# Patient Record
Sex: FEMALE | Race: WHITE | NOT HISPANIC OR LATINO | Employment: UNEMPLOYED | URBAN - METROPOLITAN AREA
[De-identification: names, ages, dates, MRNs, and addresses within clinical notes are randomized per-mention and may not be internally consistent; named-entity substitution may affect disease eponyms.]

---

## 2020-09-17 ENCOUNTER — OFFICE VISIT (OUTPATIENT)
Dept: OPHTHALMOLOGY | Facility: CLINIC | Age: 55
End: 2020-09-17
Attending: OPHTHALMOLOGY
Payer: COMMERCIAL

## 2020-09-17 DIAGNOSIS — H35.9 RETINAL LESION: ICD-10-CM

## 2020-09-17 DIAGNOSIS — L72.0: ICD-10-CM

## 2020-09-17 DIAGNOSIS — H52.203 MYOPIA OF BOTH EYES WITH ASTIGMATISM: ICD-10-CM

## 2020-09-17 DIAGNOSIS — H40.003 GLAUCOMA SUSPECT OF BOTH EYES: Primary | ICD-10-CM

## 2020-09-17 DIAGNOSIS — H52.13 MYOPIA OF BOTH EYES WITH ASTIGMATISM: ICD-10-CM

## 2020-09-17 PROCEDURE — 92015 DETERMINE REFRACTIVE STATE: CPT | Mod: ZF

## 2020-09-17 PROCEDURE — 40001009 ZZH VIDEO/TELEPHONE VISIT; NO CHARGE

## 2020-09-17 ASSESSMENT — VISUAL ACUITY
OD_SC: 20/20
OD_SC+: -2
METHOD: SNELLEN - LINEAR
OS_SC: 20/20-1

## 2020-09-17 ASSESSMENT — SLIT LAMP EXAM - LIDS
COMMENTS: NORMAL
COMMENTS: NORMAL

## 2020-09-17 ASSESSMENT — REFRACTION_MANIFEST
OD_SPHERE: -0.25
OD_CYLINDER: SPHERE
OS_AXIS: 065
OD_ADD: +1.75
OS_ADD: +1.75
OS_SPHERE: -0.25
OS_CYLINDER: +0.75

## 2020-09-17 ASSESSMENT — TONOMETRY
OS_IOP_MMHG: 13
IOP_METHOD: TONOPEN
OD_IOP_MMHG: 13

## 2020-09-17 ASSESSMENT — CUP TO DISC RATIO
OS_RATIO: 0.65
OD_RATIO: 0.4

## 2020-09-17 ASSESSMENT — EXTERNAL EXAM - LEFT EYE: OS_EXAM: NORMAL

## 2020-09-17 ASSESSMENT — CONF VISUAL FIELD
OS_NORMAL: 1
OD_NORMAL: 1

## 2020-09-17 ASSESSMENT — EXTERNAL EXAM - RIGHT EYE: OD_EXAM: NORMAL

## 2020-09-17 NOTE — PROGRESS NOTES
"  OPHTHALMOLOGY CLINIC NOTE  09/17/20    Patient: Sharri Watson    HISTORY OF PRESENTING ILLNESS:     Sharri Watson is a 55 year old female with no PMHx presenting for a CEE.     Patient states that she saw some \"bubbles \"underneath her eye, about a year ago.  She was evaluated by an optometrist, who referred her here for an eye examination.  She states that these lesions have remained about the same size -they were initially large, although now they have decreased and are now the same size.  These lesions don't cause any pain, they don't get red.     She reports that she is interested in progressive lenses.  Presently, she reports difficulty seeing up close. She uses reading glasses which help; no glasses for distance.      Furthermore patient denies any other change in her vision, no floaters, no diplopia, no loss of vision or shadow of her vision.  She denies flashes of lights, although states that she has headaches infrequently -- associated with scintillating scotomas, with flashing alternating triangular prisms in the peripheral aspect of her eyes. She has these headaches every few months or so.     10+ review of systems were otherwise negative except for that which has been stated above.    OCULAR/MEDICAL/SURGICAL HISTORIES:     Past Medical History:  None - No HTN, No DM. No other significant medical history.    Past Ocular History:  No eye hx.   Stopped wearing glasses - at age 31.  Started wearing readers for  Up close.   No hx of eye surgeries.  No ocular trauma.     Family History:  Grandmother with Glaucoma, became legally blind from this.  Both parents with cataract; sister getting cataract surgery.  Dad with macular degeneration     Social History:  No smoke.     History reviewed. No pertinent past medical history.    History reviewed. No pertinent surgical history.    EXAMINATION:     Base Eye Exam     Visual Acuity (Snellen - Linear)       Right Left    Dist sc 20/20 -2 20/20-1          " Tonometry (Tonopen, 1:24 PM)       Right Left    Pressure 13 13          Pupils       React APD    Right Reactive None    Left Reactive None          Visual Fields       Left Right     Full Full          Extraocular Movement       Right Left     Full Full          Neuro/Psych     Oriented x3:  Yes            Slit Lamp and Fundus Exam     External Exam       Right Left    External Normal Normal          Slit Lamp Exam       Right Left    Lids/Lashes Normal Normal    Conjunctiva/Sclera White and quiet; cystic clear lesion in the inferonasal and inferotemporal aspect of bulbar conjunctiva White and quiet    Cornea Clear Clear    Anterior Chamber Deep and quiet Deep and quiet    Iris Round and reactive -> Dilating Round and reactive -> Dilating    Lens Trace NS Trace NS    Vitreous Normal Normal          Fundus Exam       Right Left    Disc Normal Normal    C/D Ratio 0.4 0.65    Macula Normal Normal; temporal macula with small hyperpigmented lesion    Vessels Normal Normal    Periphery Several small hypopigmented yellow lesions in superotemporal aspect.  Several small hypopigmented yellow lesions in superotemporal aspect.             Refraction     Manifest Refraction       Sphere Cylinder [place] Dist VA Add Near VA    Right -0.25 Sphere  20/20 +1.75 J1+    Left -0.25 +0.75 065 20/20 +1.75 J1+          Final Rx       Sphere Cylinder [place] Dist VA Add Near VA    Right -0.25 Sphere  20/20 +1.75 J1+    Left -0.25 +0.75 065 20/20 +1.75 J1+              Labs/Studies/Imaging Performed  N/A     ASSESSMENT/PLAN:     Sharri Watson is a 55 year old female with no PMHx presenting for a CEE.     (H40.003) Glaucoma suspect of both eyes  (primary encounter diagnosis)  Comment:  Based on C/D Asymmetry (0.4 right eye, 0.65 left eye).  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 They have not s/f changed in size. Cystic fluid filled conjunctival lesion. Explained r/b/a of removal of lesion, with risks of recurrence. Patient wants to undergo removal of these lesions.    Plan: Plan for removal with cyst drainage next visit.     (H35.9) Retinal lesion  Comment: Yellow circular hypopigmented lesions present in the superotemporal aspect of both eyes. No signs of active inflammation with this, with no retinitis, or vasculitis, or vitreous debris.   Plan: Obtain Optos photograph next visit.     (H52.13,  H52.203) Myopia of both eyes with astigmatism  Comment: New MRx with BCVA of 20/20 each eye, and J1+  Plan: MRx provided to patient.     Return in about 3 weeks (around 10/8/2020) for HVF, OCT RNFL, pachy, Optos photos, next visit.    Patient seen with Dr. Joseph.     Ketan Henry MD PGY2  Department of Ophthalmology  Pager: [phone]    Attending Physician Attestation:  Complete documentation of historical and exam elements from today's encounter can be found in the full encounter summary report (not reduplicated in this progress note).  I personally obtained the chief complaint(s) and history of present illness.  I confirmed and edited as necessary the review of systems, past medical/surgical history, family history, social history, and examination findings as documented by others; and I examined the patient myself.  I personally reviewed the relevant tests, images, and reports as documented above.  I formulated and edited as necessary the assessment and plan and discussed the findings and management plan with the patient and family. . - Jan Joseph MD

## 2020-11-05 ENCOUNTER — OFFICE VISIT (OUTPATIENT)
Dept: OPHTHALMOLOGY | Facility: CLINIC | Age: 55
End: 2020-11-05
Attending: OPHTHALMOLOGY
Payer: COMMERCIAL

## 2020-11-05 DIAGNOSIS — H35.9 RETINAL LESION: ICD-10-CM

## 2020-11-05 DIAGNOSIS — H40.003 GLAUCOMA SUSPECT OF BOTH EYES: Primary | ICD-10-CM

## 2020-11-05 DIAGNOSIS — L72.0: ICD-10-CM

## 2020-11-05 PROCEDURE — 92133 CPTRZD OPH DX IMG PST SGM ON: CPT | Performed by: OPHTHALMOLOGY

## 2020-11-05 PROCEDURE — 99207 OCT OPTIC NERVE RNFL SPECTRALIS OU (BOTH EYES): CPT | Performed by: OPHTHALMOLOGY

## 2020-11-05 PROCEDURE — 92250 FUNDUS PHOTOGRAPHY W/I&R: CPT | Performed by: OPHTHALMOLOGY

## 2020-11-05 PROCEDURE — G0463 HOSPITAL OUTPT CLINIC VISIT: HCPCS

## 2020-11-05 PROCEDURE — 99213 OFFICE O/P EST LOW 20 MIN: CPT | Performed by: OPHTHALMOLOGY

## 2020-11-05 PROCEDURE — 92083 EXTENDED VISUAL FIELD XM: CPT | Performed by: OPHTHALMOLOGY

## 2020-11-05 ASSESSMENT — VISUAL ACUITY
OD_SC: 20/20
METHOD: SNELLEN - LINEAR
OS_SC: 20/20 SLOW

## 2020-11-05 ASSESSMENT — PACHYMETRY
OD_CT(UM): 546
OS_CT(UM): 546

## 2020-11-05 ASSESSMENT — TONOMETRY
IOP_METHOD: APPLANATION
OS_IOP_MMHG: 19
OD_IOP_MMHG: 17

## 2020-11-05 ASSESSMENT — EXTERNAL EXAM - LEFT EYE: OS_EXAM: NORMAL

## 2020-11-05 ASSESSMENT — CUP TO DISC RATIO
OS_RATIO: 0.65
OD_RATIO: 0.4

## 2020-11-05 ASSESSMENT — CONF VISUAL FIELD
OD_NORMAL: 1
OS_NORMAL: 1
METHOD: COUNTING FINGERS

## 2020-11-05 ASSESSMENT — EXTERNAL EXAM - RIGHT EYE: OD_EXAM: NORMAL

## 2020-11-05 ASSESSMENT — SLIT LAMP EXAM - LIDS
COMMENTS: NORMAL
COMMENTS: NORMAL

## 2020-11-05 NOTE — NURSING NOTE
Chief Complaint(s) and History of Present Illness(es)     Glaucoma Follow-Up     In both eyes.  Associated symptoms include Negative for dryness, eye pain, redness and tearing.  Pain was noted as 0/10.              Comments     7 week f/u for Glaucoma suspect and also plan for removal with cyst drainage today. Vision is the same since the last visit. Pt denies any other changes.     Ocular meds: Visine JIMN IRMA [place] 10:14 AM November 5, 2020

## 2020-11-05 NOTE — PROGRESS NOTES
Chief Complaint(s) and History of Present Illness(es)     Glaucoma Follow-Up     Laterality: both eyes    Associated symptoms: Negative for dryness, eye pain, redness and tearing    Pain scale: 0/10              Comments     7 week f/u for Glaucoma suspect and also plan for removal with cyst   drainage today. Vision is the same since the last visit. Pt denies any   other changes.     Ocular meds: Visine PRN BE    Judie Woods, COMT 10:14 AM November 5, 2020                 Review of systems for the eyes was negative other than the pertinent positives/negatives listed in the HPI.      Assessment & Plan      Sharri Watson is a 55 year old female with the following diagnoses:   1. Glaucoma suspect of both eyes    2. Retinal lesion    3. 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 Henry last month and is returning for glaucoma work-up   Intraocular pressure excellent again today   Physiologic cupping left eye   Baseline OCT and visual field within normal limits   Fundus photos obtained    Interested in having the conjunctival cysts excised in the right eye   Causing some foreign body sensation and intermittent blur  Would have best result doing this in the OR with better equipment  RBA reviewed, consent obtained, will schedule               Attending Physician Attestation:  Complete documentation of historical and exam elements from today's encounter can be found in the full encounter summary report (not reduplicated in this progress note).  I personally obtained the chief complaint(s) and history of present illness.  I confirmed and edited as necessary the review of systems, past medical/surgical history, family history, social history, and examination findings as documented by others; and I examined the patient myself.  I personally reviewed the relevant tests, images, and reports as documented above.  I formulated and edited as necessary the assessment and plan and discussed the findings  and management plan with the patient and family. . - Jan Joseph MD

## 2020-11-06 ENCOUNTER — TELEPHONE (OUTPATIENT)
Dept: OPHTHALMOLOGY | Facility: CLINIC | Age: 55
End: 2020-11-06

## 2020-11-06 DIAGNOSIS — Z11.59 ENCOUNTER FOR SCREENING FOR OTHER VIRAL DISEASES: Primary | ICD-10-CM

## 2020-11-06 PROBLEM — L72.0: Status: ACTIVE | Noted: 2020-11-06

## 2020-11-06 NOTE — TELEPHONE ENCOUNTER
FUTURE VISIT INFORMATION      SURGERY INFORMATION:    Date: 20    [facility]    Surgeon:  Jan Joseph MD    Anesthesia Type:  Combined MAC with Topical    Procedure: EXCISION,  CONJUNCTIVA CYSTS    Consult: ov     RECORDS REQUESTED FROM:       Primary Care Provider: Shivam Mukherjee MD- [facility]    Most recent EKG+ Tracin17- [facility]    Most recent ECHO: 2000- [facility]

## 2020-11-06 NOTE — TELEPHONE ENCOUNTER
Spoke with patient to schedule right eye surgery with Dr. Joseph    Surgery was scheduled on 11/16 at [facility]  Patient will have H&P at [facility] on 11/12     Patient is aware a COVID-19 test is needed before their procedure. The test should be with-in 4 days of their procedure.   Test Details: Date 11/12 Location [facility] lab    Post-Op visit was scheduled on 11/16 and 12/1   Patient was advised a / is needed day of surgery. As well as, for 24 hours after their surgery procedure.  Surgery packet was mailed 11/6, patient has my direct contact information for any further questions [phone].

## 2020-11-09 ENCOUNTER — DOCUMENTATION ONLY (OUTPATIENT)
Dept: CARE COORDINATION | Facility: CLINIC | Age: 55
End: 2020-11-09

## 2020-11-12 ENCOUNTER — ANESTHESIA EVENT (OUTPATIENT)
Dept: SURGERY | Facility: AMBULATORY SURGERY CENTER | Age: 55
End: 2020-11-12

## 2020-11-12 ENCOUNTER — OFFICE VISIT (OUTPATIENT)
Dept: SURGERY | Facility: CLINIC | Age: 55
End: 2020-11-12
Payer: COMMERCIAL

## 2020-11-12 ENCOUNTER — APPOINTMENT (OUTPATIENT)
Dept: LAB | Facility: CLINIC | Age: 55
End: 2020-11-12
Payer: COMMERCIAL

## 2020-11-12 ENCOUNTER — PRE VISIT (OUTPATIENT)
Dept: SURGERY | Facility: CLINIC | Age: 55
End: 2020-11-12

## 2020-11-12 VITALS
RESPIRATION RATE: 14 BRPM | HEART RATE: 74 BPM | BODY MASS INDEX: 19.13 KG/M2 | DIASTOLIC BLOOD PRESSURE: 54 MMHG | OXYGEN SATURATION: 100 % | WEIGHT: 119 LBS | HEIGHT: 66 IN | SYSTOLIC BLOOD PRESSURE: 84 MMHG

## 2020-11-12 DIAGNOSIS — Z11.59 ENCOUNTER FOR SCREENING FOR OTHER VIRAL DISEASES: ICD-10-CM

## 2020-11-12 DIAGNOSIS — Z01.818 PREOP EXAMINATION: Primary | ICD-10-CM

## 2020-11-12 PROCEDURE — 99000 SPECIMEN HANDLING OFFICE-LAB: CPT | Performed by: PATHOLOGY

## 2020-11-12 PROCEDURE — 99203 OFFICE O/P NEW LOW 30 MIN: CPT | Performed by: PHYSICIAN ASSISTANT

## 2020-11-12 PROCEDURE — U0003 INFECTIOUS AGENT DETECTION BY NUCLEIC ACID (DNA OR RNA); SEVERE ACUTE RESPIRATORY SYNDROME CORONAVIRUS 2 (SARS-COV-2) (CORONAVIRUS DISEASE [COVID-19]), AMPLIFIED PROBE TECHNIQUE, MAKING USE OF HIGH THROUGHPUT TECHNOLOGIES AS DESCRIBED BY CMS-2020-01-R: HCPCS | Mod: 90 | Performed by: PATHOLOGY

## 2020-11-12 ASSESSMENT — PAIN SCALES - GENERAL: PAINLEVEL: NO PAIN (0)

## 2020-11-12 ASSESSMENT — LIFESTYLE VARIABLES: TOBACCO_USE: 0

## 2020-11-12 ASSESSMENT — MIFFLIN-ST. JEOR: SCORE: 1161.53

## 2020-11-12 NOTE — H&P
Pre-Operative H & P     CC:  Preoperative exam to assess for increased cardiopulmonary risk while undergoing surgery and anesthesia.    Date of Encounter: 11/12/2020  Primary Care Physician:  Shivam Mukherjee  Associated Diagnosis: epithelial inclusion cyst of conjunctive, right    HPI  Sharri Watson is a 55 year old female who presents for pre-operative H & P in preparation for Excision of conjunctiva cysts, right with Dr. Joseph on 11/16/2020 at [facility]. MAC with topical anesthesia.    This is a 55-year-old female patient with an unremarkable past medical history.  She is followed by ophthalmology for myopia of both eyes with astigmatism, a retinal lesion bilaterally, suspicion of glaucoma, and epithelial inclusion cyst of the right eye conjunctiva.  She has had epithelial cysts in the right eye for about a year.  They have not changed in size, nor do they cause her pain.  Patient reports they cause a foreign body sensation with intermittent blurry vision.  She would like to have these removed.     History is obtained from the patient and chart review.     Past Medical History  Past Medical History:   Diagnosis Date     Epithelial inclusion cyst of conjunctiva        Past Surgical History  Past Surgical History:   Procedure Laterality Date     AS HYSTEROSCOPY, SURGICAL; W/ ENDOMETRIAL ABLATION, ANY METHOD       DILATION AND CURETTAGE       IL BREAST AUGMENTATION       TUBAL LIGATION         Hx of Blood transfusions/reactions: denies     Hx of abnormal bleeding or anti-platelet use: denies    Menstrual history: No LMP recorded. Patient is postmenopausal.:     Steroid use in the last year: denies    Personal or FH with difficulty with Anesthesia:  denies    Prior to Admission Medications  Current Outpatient Medications   Medication Sig Dispense Refill     Multiple Vitamins-Minerals (EYE VITAMINS) CAPS Take 1 tablet by mouth every morning        Multiple Vitamins-Minerals  (MULTIVITAMIN ADULT PO) Take 1 tablet by mouth every morning          Allergies  Allergies   Allergen Reactions     Codeine Nausea and Vomiting       Social History  Social History     Socioeconomic History     Marital status:      Spouse name: Not on file     Number of children: Not on file     Years of education: Not on file     Highest education level: Not on file   Occupational History     Not on file   Social Needs     Financial resource strain: Not on file     Food insecurity     Worry: Not on file     Inability: Not on file     Transportation needs     Medical: Not on file     Non-medical: Not on file   Tobacco Use     Smoking status: Never Smoker     Smokeless tobacco: Never Used   Substance and Sexual Activity     Alcohol use: Not on file     Drug use: Not on file     Sexual activity: Not on file   Lifestyle     Physical activity     Days per week: Not on file     Minutes per session: Not on file     Stress: Not on file   Relationships     Social connections     Talks on phone: Not on file     Gets together: Not on file     Attends Christian service: Not on file     Active member of club or organization: Not on file     Attends meetings of clubs or organizations: Not on file     Relationship status: Not on file     Intimate partner violence     Fear of current or ex partner: Not on file     Emotionally abused: Not on file     Physically abused: Not on file     Forced sexual activity: Not on file   Other Topics Concern     Not on file   Social History Narrative     Not on file       Family History  Family History   Problem Relation Age of Onset     Macular Degeneration Mother      Macular Degeneration Father      Glaucoma Maternal Grandmother            Anesthesia Evaluation  Pt has had prior anesthesia.  History of PONV   .         ROS/MED HX    ENT/Pulmonary:  - neg pulmonary ROS    (-) tobacco use and sleep apnea   Neurologic:  - neg neurologic ROS     Cardiovascular:  - neg cardiovascular ROS  "  (+) ----. : . . . :. . No previous cardiac testing       METS/Exercise Tolerance:  >4 METS   Hematologic:  - neg hematologic  ROS       Musculoskeletal:  - neg musculoskeletal ROS       GI/Hepatic:  - neg GI/hepatic ROS       Renal/Genitourinary:  - ROS Renal section negative       Endo:  - neg endo ROS       Psychiatric:  - neg psychiatric ROS       Infectious Disease:  - neg infectious disease ROS       Malignancy:      - no malignancy   Other:    (+) no H/O Chronic Pain,  - neg other ROS         The complete review of systems is negative other than noted in the HPI or here.       BP: (!) 84/54 Pulse: 74   Resp: 14 SpO2: 100 %         119 lbs 0 oz  5' 6\"   Body mass index is 19.21 kg/m .       Physical Exam  Constitutional: Awake, alert, cooperative, no apparent distress, and appears stated age.  Eyes: Pupils equal, round and reactive to light, extra ocular muscles intact, sclera clear, conjunctiva normal.  HENT: Normocephalic, oral pharynx with moist mucus membranes, good dentition. No goiter appreciated.   Respiratory: Clear to auscultation bilaterally, no crackles or wheezing.  Cardiovascular: Regular rate and rhythm, normal S1 and S2, and no murmur noted.  Carotids +2, no bruits. No edema. Palpable pulses to radial  DP and PT arteries.   GI: Normal bowel sounds  Lymph/Hematologic: No cervical lymphadenopathy and no supraclavicular lymphadenopathy.  Genitourinary:  deferred  Skin: Warm and dry.  No rashes at anticipated surgical site.   Musculoskeletal: Full ROM of neck. There is no redness, warmth, or swelling of the joints. Gross motor strength is normal.    Neurologic: Awake, alert, oriented to name, place and time. Cranial nerves II-XII are grossly intact. Gait is normal.   Neuropsychiatric: Calm, cooperative. Normal affect.     Labs: (personally reviewed)  9/24/2020  SODIUM 135 - 145 mmol/L 140    POTASSIUM 3.5 - 5.0 mmol/L 3.9    CHLORIDE 98 - 110 mmol/L 106    CO2,TOTAL 21 - 31 mmol/L 23    ANION GAP 5 " - 18  11      HEMOGLOBIN                12.0 - 16.0 g/dL 12.5    MCV                       80 - 100 fL 86          Outside records reviewed from: care everywhere      ASSESSMENT and PLAN  Sharri Watson is a 55 year old female scheduled for Excision of conjunctiva cysts, right on 11/16/2020 by Dr. Joseph in treatment of epithelial inclusion cyst of conjunctiva.  PAC referral for risk assessment and optimization for anesthesia:    Pre-operative considerations:  1.  Cardiac:  Functional status- METS >4.  Low risk surgery with 0.4% (RCRI #) risk of major adverse cardiac event.   2.  Pulm:  Airway feasible.  HUGH risk: Low.  Never smoker  3.  GI:  Risk of PONV score = 2.  If > 2, anti-emetic intervention recommended. History of PONV.      VTE risk: 0.26%      Patient is optimized and is acceptable candidate for the proposed procedure.  No further diagnostic evaluation is needed.       Humaira Warner PA-C  Preoperative Assessment Center  [facility]  Phone: [phone]  Fax: [phone]

## 2020-11-12 NOTE — ANESTHESIA PREPROCEDURE EVALUATION
"Anesthesia Pre-Procedure Evaluation    Patient: Sharri Watson   MRN:     [id number] Gender:   female   Age:    55 year old :      1965        Preoperative Diagnosis: * No surgery found *        LABS:  CBC:   Lab Results   Component Value Date    WBC 11.4 (H) 2006    HGB 11.3 (L) 2006    HCT 33.9 (L) 2006     2006     BMP: No results found for: NA, POTASSIUM, CHLORIDE, CO2, BUN, CR, GLC  COAGS: No results found for: PTT, INR, FIBR  POC: No results found for: BGM, HCG, HCGS  OTHER: No results found for: PH, LACT, A1C, ERIN, PHOS, MAG, ALBUMIN, PROTTOTAL, ALT, AST, GGT, ALKPHOS, BILITOTAL, BILIDIRECT, LIPASE, AMYLASE, AINSLEY, TSH, T4, T3, CRP, SED     Preop Vitals    BP Readings from Last 3 Encounters:   No data found for BP    Pulse Readings from Last 3 Encounters:   No data found for Pulse      Resp Readings from Last 3 Encounters:   No data found for Resp    SpO2 Readings from Last 3 Encounters:   No data found for SpO2      Temp Readings from Last 1 Encounters:   No data found for Temp    Ht Readings from Last 1 Encounters:   13 1.676 m (5' 6\")      Wt Readings from Last 1 Encounters:   13 57.6 kg (127 lb)    Estimated body mass index is 20.5 kg/m  as calculated from the following:    Height as of 3/26/13: 1.676 m (5' 6\").    Weight as of 3/26/13: 57.6 kg (127 lb).     LDA:        Past Medical History:   Diagnosis Date     Epithelial inclusion cyst of conjunctiva       History reviewed. No pertinent surgical history.   Allergies   Allergen Reactions     Codeine Nausea and Vomiting        Anesthesia Evaluation     . Pt has had prior anesthetic.     History of anesthetic complications   - PONV        ROS/MED HX    ENT/Pulmonary:  - neg pulmonary ROS    (-) tobacco use and sleep apnea   Neurologic:  - neg neurologic ROS     Cardiovascular:  - neg cardiovascular ROS   (+) ----. : . . . :. . No previous cardiac testing       METS/Exercise Tolerance:  >4 METS "   Hematologic:  - neg hematologic  ROS       Musculoskeletal:  - neg musculoskeletal ROS       GI/Hepatic:  - neg GI/hepatic ROS       Renal/Genitourinary:  - ROS Renal section negative       Endo:  - neg endo ROS       Psychiatric:  - neg psychiatric ROS       Infectious Disease:  - neg infectious disease ROS       Malignancy:      - no malignancy   Other:    (+) no H/O Chronic Pain,  - neg other ROS                     PHYSICAL EXAM:   Mental Status/Neuro: A/A/O; Age Appropriate   Airway: Facies: Feasible  Mallampati: I  Mouth/Opening: Full  TM distance: > 6 cm  Neck ROM: Full   Respiratory: Auscultation: CTAB     Resp. Rate: Normal     Resp. Effort: Normal      CV: Rhythm: Regular  Rate: Age appropriate  Heart: Normal Sounds  Edema: None   Comments:      Dental: Normal Dentition                Assessment:   ASA SCORE: 1    H&P: History and physical reviewed and following examination; no interval change.   Smoking Status:  Non-Smoker/Unknown   NPO Status: NPO Appropriate     Plan:   Anes. Type:  MAC   Pre-Medication: None   Induction:  N/a   Airway: Native Airway   Access/Monitoring: PIV   Maintenance: N/a     Postop Plan:   Postop Pain: None  Postop Sedation/Airway: Not planned  Disposition: Outpatient     PONV Management:   Adult Risk Factors: Female, Non-Smoker   Prevention: Ondansetron, Dexamethasone     CONSENT: Direct conversation   Plan and risks discussed with: Patient                   PAC Discussion and Assessment    ASA Classification: 1  Case is suitable for: [facility]  Anesthetic techniques and relevant risks discussed: MAC with GA as backup  Invasive monitoring and risk discussed: No  Types:   Possibility and Risk of blood transfusion discussed: No  NPO instructions given:   Additional anesthetic preparation and risks discussed:   Needs early admission to pre-op area:   Other:     PAC Resident/NP Anesthesia Assessment:  Sharri Watson is a 55 year old female scheduled for Excision of conjunctiva cysts, right  on 11/16/2020 by Dr. Joseph in treatment of epithelial inclusion cyst of conjunctiva.  PAC referral for risk assessment and optimization for anesthesia:    Pre-operative considerations:  1.  Cardiac:  Functional status- METS >4.  Low risk surgery with 0.4% (RCRI #) risk of major adverse cardiac event.   2.  Pulm:  Airway feasible.  HUGH risk: Low.  Never smoker  3.  GI:  Risk of PONV score = 2.  If > 2, anti-emetic intervention recommended.      VTE risk: 0.26%      Patient is optimized and is acceptable candidate for the proposed procedure.  No further diagnostic evaluation is needed.       **For further details of assessment, testing, and physical exam please see H and P completed on same date.          Humaira Warner PA-C, [facility]      Reviewed and Signed by PAC Mid-Level Provider/Resident  Mid-Level Provider/Resident: Humaira Warner  Date: 11/12/2020  Time:     Attending Anesthesiologist Anesthesia Assessment:        Anesthesiologist:   Date:   Time:   Pass/Fail:   Disposition:     PAC Pharmacist Assessment:        Pharmacist:   Date:   Time:    Humaira Warner PA-C

## 2020-11-12 NOTE — PATIENT INSTRUCTIONS
Preparing for Your Surgery      Name:  Sharri Watson   MRN:  [id number]   :  1965   Today's Date:  2020         Arriving for surgery:  Surgery date:  2020  Arrival time:  10:30AM    Restrictions due to COVID 19:  Patients are allowed one visitor  All visitors must wear a mask  No visitors under 18  No ill visitors   parking is available for anyone with mobility limitations or disabilities. (Monday- Friday 7 am- 5 pm)    Please come to:    [facility]  [address] [place] [phone]    Please check in on the 5th floor at the Ambulatory Surgery Center       What can I eat or drink?    -  You may eat and drink normally until 8 hours before surgery. (Until 2020, 4AM)  -  You may have clear liquids up to 4 hours before surgery. (Until 2020, 8AM)  Examples of clear liquids:  Water  Clear broth  Juices (apple, white grape, white cranberry  and cider) without pulp  Noncarbonated, powder based beverages  (lemonade and Juan José-Aid)  Sodas (Sprite, 7-Up, ginger ale and seltzer)  Coffee or tea (without milk or cream)  Gatorade    --No alcohol for at least 24 hours before surgery    Which medicines can I take?    Hold Aspirin for 7 days before surgery.   Hold Multivitamins for 7 days before surgery.  Hold Supplements for 7 days before surgery.  Hold Ibuprofen (Advil, Motrin) for 1 day before surgery--unless otherwise directed by surgeon.  Hold Naproxen (Aleve) for 4 days before surgery.    -  DO NOT take the following medications the day of surgery:    Multivitamin Eye tablet    How do I prepare myself?  - Please take 2 showers before surgery using Scrubcare or Hibiclens soap.    Use this soap only from the neck to your toes.     Leave the soap on your skin for one minute--then rinse thoroughly.      You may use your own shampoo and conditioner; no other hair products.   - Please remove all jewelry and body piercings.  - No lotions, deodorants or  fragrance.  - No makeup or fingernail polish.   - Bring your ID and insurance card.        - All patients are required to have a Covid-19 test within 4 days of surgery/procedure.      -Patients will be contacted by the [facility] scheduling team within 1 week of surgery to make an appointment.      - Patients may call the Scheduling team at [phone] if they have not been scheduled within 4 days of  surgery.      ALL PATIENTS ARE REQUIRED TO HAVE A RESPONSIBLE ADULT TO DRIVE AND BE IN ATTENDANCE WITH THEM FOR 24 HOURS FOLLOWING SURGERY       Questions or Concerns:    -For questions regarding the day of surgery please contact the Ambulatory Surgery Center at [phone].    -If you have health changes between today and your surgery please contact your surgeon.     For questions after surgery please call your surgeons office.

## 2020-11-13 LAB
SARS-COV-2 RNA SPEC QL NAA+PROBE: NOT DETECTED
SPECIMEN SOURCE: NORMAL

## 2020-11-13 RX ORDER — MEPERIDINE HYDROCHLORIDE 25 MG/ML
12.5 INJECTION INTRAMUSCULAR; INTRAVENOUS; SUBCUTANEOUS
Status: CANCELLED | OUTPATIENT
Start: 2020-11-13

## 2020-11-13 RX ORDER — SODIUM CHLORIDE, SODIUM LACTATE, POTASSIUM CHLORIDE, CALCIUM CHLORIDE 600; 310; 30; 20 MG/100ML; MG/100ML; MG/100ML; MG/100ML
INJECTION, SOLUTION INTRAVENOUS CONTINUOUS
Status: CANCELLED | OUTPATIENT
Start: 2020-11-13

## 2020-11-13 RX ORDER — OXYCODONE HCL 5 MG/5 ML
5 SOLUTION, ORAL ORAL EVERY 4 HOURS PRN
Status: CANCELLED | OUTPATIENT
Start: 2020-11-13

## 2020-11-13 RX ORDER — ONDANSETRON 4 MG/1
4 TABLET, ORALLY DISINTEGRATING ORAL EVERY 30 MIN PRN
Status: CANCELLED | OUTPATIENT
Start: 2020-11-13

## 2020-11-13 RX ORDER — ONDANSETRON 2 MG/ML
4 INJECTION INTRAMUSCULAR; INTRAVENOUS EVERY 30 MIN PRN
Status: CANCELLED | OUTPATIENT
Start: 2020-11-13

## 2020-11-13 RX ORDER — LABETALOL 20 MG/4 ML (5 MG/ML) INTRAVENOUS SYRINGE
10
Status: CANCELLED | OUTPATIENT
Start: 2020-11-13

## 2020-11-13 RX ORDER — NALOXONE HYDROCHLORIDE 0.4 MG/ML
.1-.4 INJECTION, SOLUTION INTRAMUSCULAR; INTRAVENOUS; SUBCUTANEOUS
Status: CANCELLED | OUTPATIENT
Start: 2020-11-13 | End: 2020-11-14

## 2020-11-13 RX ORDER — HYDROMORPHONE HYDROCHLORIDE 1 MG/ML
.3-.5 INJECTION, SOLUTION INTRAMUSCULAR; INTRAVENOUS; SUBCUTANEOUS EVERY 10 MIN PRN
Status: CANCELLED | OUTPATIENT
Start: 2020-11-13

## 2020-11-13 RX ORDER — FENTANYL CITRATE 50 UG/ML
25-50 INJECTION, SOLUTION INTRAMUSCULAR; INTRAVENOUS
Status: CANCELLED | OUTPATIENT
Start: 2020-11-13

## 2020-11-16 ENCOUNTER — ANESTHESIA (OUTPATIENT)
Dept: SURGERY | Facility: AMBULATORY SURGERY CENTER | Age: 55
End: 2020-11-16

## 2020-11-16 ENCOUNTER — HOSPITAL ENCOUNTER (OUTPATIENT)
Facility: AMBULATORY SURGERY CENTER | Age: 55
Discharge: HOME | End: 2020-11-16
Attending: OPHTHALMOLOGY | Admitting: OPHTHALMOLOGY
Payer: COMMERCIAL

## 2020-11-16 VITALS
SYSTOLIC BLOOD PRESSURE: 100 MMHG | RESPIRATION RATE: 16 BRPM | WEIGHT: 120 LBS | BODY MASS INDEX: 19.29 KG/M2 | HEART RATE: 60 BPM | TEMPERATURE: 97.6 F | DIASTOLIC BLOOD PRESSURE: 65 MMHG | OXYGEN SATURATION: 99 % | HEIGHT: 66 IN

## 2020-11-16 DIAGNOSIS — L72.0: Primary | ICD-10-CM

## 2020-11-16 PROCEDURE — 68115 EXC LES CONJUNCTIVA >1 CM: CPT | Mod: RT

## 2020-11-16 PROCEDURE — 68115 EXC LES CONJUNCTIVA >1 CM: CPT | Mod: RT | Performed by: OPHTHALMOLOGY

## 2020-11-16 RX ORDER — CYCLOPENTOLAT/TROPIC/PHENYLEPH 1%-1%-2.5%
1 DROPS (EA) OPHTHALMIC (EYE)
Status: COMPLETED | OUTPATIENT
Start: 2020-11-16 | End: 2020-11-16

## 2020-11-16 RX ORDER — SODIUM CHLORIDE, SODIUM LACTATE, POTASSIUM CHLORIDE, CALCIUM CHLORIDE 600; 310; 30; 20 MG/100ML; MG/100ML; MG/100ML; MG/100ML
INJECTION, SOLUTION INTRAVENOUS CONTINUOUS
Status: DISCONTINUED | OUTPATIENT
Start: 2020-11-16 | End: 2020-11-17 | Stop reason: HOSPADM

## 2020-11-16 RX ORDER — LIDOCAINE HYDROCHLORIDE 10 MG/ML
INJECTION, SOLUTION EPIDURAL; INFILTRATION; INTRACAUDAL; PERINEURAL PRN
Status: DISCONTINUED | OUTPATIENT
Start: 2020-11-16 | End: 2020-11-16 | Stop reason: HOSPADM

## 2020-11-16 RX ORDER — FENTANYL CITRATE 50 UG/ML
INJECTION, SOLUTION INTRAMUSCULAR; INTRAVENOUS PRN
Status: DISCONTINUED | OUTPATIENT
Start: 2020-11-16 | End: 2020-11-16

## 2020-11-16 RX ORDER — PROPARACAINE HYDROCHLORIDE 5 MG/ML
1 SOLUTION/ DROPS OPHTHALMIC ONCE
Status: COMPLETED | OUTPATIENT
Start: 2020-11-16 | End: 2020-11-16

## 2020-11-16 RX ORDER — LIDOCAINE 40 MG/G
CREAM TOPICAL
Status: DISCONTINUED | OUTPATIENT
Start: 2020-11-16 | End: 2020-11-17 | Stop reason: HOSPADM

## 2020-11-16 RX ORDER — SODIUM CHLORIDE, SODIUM LACTATE, POTASSIUM CHLORIDE, CALCIUM CHLORIDE 600; 310; 30; 20 MG/100ML; MG/100ML; MG/100ML; MG/100ML
INJECTION, SOLUTION INTRAVENOUS CONTINUOUS
Status: CANCELLED | OUTPATIENT
Start: 2020-11-16

## 2020-11-16 RX ORDER — OFLOXACIN 3 MG/ML
1 SOLUTION/ DROPS OPHTHALMIC 3 TIMES DAILY
Qty: 1 BOTTLE | Refills: 0 | Status: SHIPPED | OUTPATIENT
Start: 2020-11-16 | End: 2023-10-23

## 2020-11-16 RX ORDER — ACETAMINOPHEN 325 MG/1
975 TABLET ORAL ONCE
Status: COMPLETED | OUTPATIENT
Start: 2020-11-16 | End: 2020-11-16

## 2020-11-16 RX ORDER — TETRACAINE HYDROCHLORIDE 5 MG/ML
SOLUTION OPHTHALMIC PRN
Status: DISCONTINUED | OUTPATIENT
Start: 2020-11-16 | End: 2020-11-16 | Stop reason: HOSPADM

## 2020-11-16 RX ORDER — NEOMYCIN SULFATE, POLYMYXIN B SULFATE, AND DEXAMETHASONE 3.5; 10000; 1 MG/G; [USP'U]/G; MG/G
0.5 OINTMENT OPHTHALMIC 3 TIMES DAILY
Qty: 1 TUBE | Refills: 1 | Status: SHIPPED | OUTPATIENT
Start: 2020-11-16 | End: 2023-10-23

## 2020-11-16 RX ORDER — ONDANSETRON 2 MG/ML
INJECTION INTRAMUSCULAR; INTRAVENOUS PRN
Status: DISCONTINUED | OUTPATIENT
Start: 2020-11-16 | End: 2020-11-16

## 2020-11-16 RX ADMIN — PROPARACAINE HYDROCHLORIDE 1 DROP: 5 SOLUTION/ DROPS OPHTHALMIC at 10:29

## 2020-11-16 RX ADMIN — SODIUM CHLORIDE, SODIUM LACTATE, POTASSIUM CHLORIDE, CALCIUM CHLORIDE: 600; 310; 30; 20 INJECTION, SOLUTION INTRAVENOUS at 11:19

## 2020-11-16 RX ADMIN — Medication 1 DROP: at 10:32

## 2020-11-16 RX ADMIN — ACETAMINOPHEN 975 MG: 325 TABLET ORAL at 12:35

## 2020-11-16 RX ADMIN — ONDANSETRON 4 MG: 2 INJECTION INTRAMUSCULAR; INTRAVENOUS at 11:28

## 2020-11-16 RX ADMIN — Medication 1 DROP: at 10:39

## 2020-11-16 RX ADMIN — Medication 1 DROP: at 10:44

## 2020-11-16 RX ADMIN — FENTANYL CITRATE 50 MCG: 50 INJECTION, SOLUTION INTRAMUSCULAR; INTRAVENOUS at 11:20

## 2020-11-16 ASSESSMENT — MIFFLIN-ST. JEOR: SCORE: 1166.07

## 2020-11-16 NOTE — ANESTHESIA POSTPROCEDURE EVALUATION
Anesthesia POST Procedure Evaluation    Patient: Sharri Watson   MRN:     [id number] Gender:   female   Age:    55 year old :      1965        Preoperative Diagnosis: Epithelial inclusion cyst of conjunctiva [L72.0]   Procedure(s):  EXCISION,  CONJUNCTIVA CYSTS   Postop Comments: No value filed.     Anesthesia Type: MAC       Disposition: Outpatient   Postop Pain Control: Uneventful            Sign Out: Well controlled pain   PONV: No   Neuro/Psych: Uneventful            Sign Out: Acceptable/Baseline neuro status   Airway/Respiratory: Uneventful            Sign Out: Acceptable/Baseline resp. status   CV/Hemodynamics: Uneventful            Sign Out: Acceptable CV status   Other NRE: NONE   DID A NON-ROUTINE EVENT OCCUR? No         Last Anesthesia Record Vitals:  CRNA VITALS  2020 1140 - 2020 1240      2020             Resp Rate (set):  10          Last PACU Vitals:  No vitals data found for the desired time range.        Electronically Signed By: Kiran Tinsley MD, MD, 2020, 12:45 PM

## 2020-11-16 NOTE — OP NOTE
PREOPERATIVE DIAGNOSIS:   1. Epithelial inclusion cysts of conjunctiva , right eye      POSTOPERATIVE DIAGNOSIS: Same   PROCEDURES:   1. Epithelial inclusion cysts of conjunctiva excision, Right eye.  SURGEON: Jan Joseph M.D.  RESIDENT: Merlene Giraldo M.D.  INDICATIONS: The patient Sharri Watson presented to the eye clinic with multiple epithelial inclusion cysts of the right eye. The risks, benefits and alternatives to cyst excision were discussed. The patient elected to proceed. All questions were answered to the patient's satisfaction.   DESCRIPTION OF PROCEDURE:   Prior to the procedure, appropriate cardiac and respiratory monitors were applied to the patient.  In the pre-operative holding area, a drop of topical tetracaine followed by lidocaine gel followed by povidone iodine.  The patient was brought to the operating room where a surgical pause was carried out to identify with all members of the surgical team the correct surgical site.  With adequate anesthesia, the Right eye was prepped and draped in the usual sterile fashion. A lid speculum was placed, and the operating microscope was rotated into position.  A 5-0 vicryl suture was placed in the inferior cornea for traction. Attention was drawn to the cluster of conjunctival cysts nasally, approximately 3mm from the limbus. A marking pen was used to celia around the cysts. The marked conjunctiva was excised using vannas scissors and smooth forceps. The cysts were superficial and did not involve the tenons. Minimal tenons was removed. The identical procedure was used to remove the cluster of conjunctival cysts temporally. Bipolar cautery was attempted to close the conjunctival incisions, however there was a technical problem and the cautery was not successful. One 7-0 vicryl was used to close each conjunctival incision, nasal and temporal. The lid speculum was removed and Maxitrol ointment and a shield were applied.  The patient tolerated the  procedure well, and there were no complications.  PLAN: The patient will be discharged to home and will follow up next week in the eye clinic.  EBL:  Minimal  Complications:  None  Merlene Giraldo MD  Ophthalmology Resident, PGY-4      Attending Physician Procedure Attestation: I was present for the entire procedure       Jan Joseph MD  , Comprehensive Ophthalmology  Department of Ophthalmology and Visual Neurosciences  [facility]

## 2020-11-16 NOTE — DISCHARGE INSTRUCTIONS
[facility]  Home Care Following Anesthesia  For 24 hours after surgery:  1. Get plenty of rest.  A responsible adult must stay with you for at least 24 hours after you leave the surgery center.  2. Do not drive or use heavy equipment.  If you have weakness or tingling, don't drive or use heavy equipment until this feeling goes away.   3. Do not drink alcohol.   4. Avoid strenuous or risky activities.  Ask for help when climbing stairs.  5. You may feel lightheaded.  IF so, sit for a few minutes before standing.  Have someone help you get up.   6. If you have nausea (feel sick to your stomach): Drink only clear liquids such as apple juice, ginger ale, broth or 7-Up.  Rest may also help.  Be sure to drink enough fluids.  Move to a regular diet as you feel able.   7. You may have a slight fever.  Call the doctor if your fever is over 100 F (37.7 C) (taken under the tongue) or lasts longer than 24 hours.  8. You may have a dry mouth, a sore throat, muscle aches or trouble sleeping. These should go away after 24 hours.  9. Do not make important or legal decisions.               Tips for taking pain medications  To get the best pain relief possible, remember these points:    Take pain medications as directed, before pain becomes severe.    Pain medication can upset your stomach: taking it with food may help.    Constipation is a common side effect of pain medication. Drink plenty of  fluids.    Eat foods high in fiber. Take a stool softener if recommended by your doctor or pharmacist.    Do not drink alcohol, drive or operate machinery while taking pain medications.    Ask about other ways to control pain, such as with heat, ice or relaxation.    Tylenol/Acetaminophen Consumption  To help encourage the safe use of acetaminophen, the makers of TYLENOL  have lowered the maximum daily dose for single-ingredient Extra Strength TYLENOL  (acetaminophen) products sold in the [place] from 8  pills per day (4,000 mg) to 6 pills per day (3,000 mg). The dosing interval has also changed from 2 pills every 4-6 hours to 2 pills every 6 hours.    If you feel your pain relief is insufficient, you may take Tylenol/Acetaminophen in addition to your narcotic pain medication.     Be careful not to exceed 3,000 mg of Tylenol/Acetaminophen in a 24 hour period from all sources.    If you are taking extra strength Tylenol/acetaminophen (500 mg), the maximum dose is 6 tablets in 24 hours.    If you are taking regular strength acetaminophen (325 mg), the maximum dose is 9 tablets in 24 hours.    You received a dose of 975 mg of Tylenol @ 1230 PM . You may take another dose of Tylenol @ 630 PM .     Call a doctor for any of the followin. Signs of infection (fever, growing tenderness at the surgery site, a large amount of drainage or bleeding, severe pain, foul-smelling drainage, redness, swelling).  2. It has been over 8 to 10 hours since surgery and you are still not able to urinate (pass water).  3. Headache for over 24 hours.  4. Signs of Covid-19 infection (temperature over 100 degrees, shortness of breath, cough, loss of taste/smell, generalized body aches, persistent headache, chills, sore throat, nausea/vomiting/diarrhea)  Your doctor is:       Dr. Jan Joseph, Ophthalmology: [phone]               Or dial [phone] and ask for the resident on call for:  Ophthalmology  For emergency care, call the:  [place] [facility]:  [phone] (TTY for hearing impaired: [phone])

## 2020-11-16 NOTE — ANESTHESIA CARE TRANSFER NOTE
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    Anesthesia Type:   MAC     Note:  Airway :Room Air  Patient transferred to:Phase II  Comments: Uneventful transport to Phase II: VSS; IV patent; pt comfortable; Report given to RN; pt. Responds appropriately to commandsHandoff Report: Identifed the Patient, Identified the Reponsible Provider, Reviewed the pertinent medical history, Discussed the surgical course, Reviewed Intra-OP anesthesia mangement and issues during anesthesia, Set expectations for post-procedure period and Allowed opportunity for questions and acknowledgement of understanding      Vitals: (Last set prior to Anesthesia Care Transfer)    CRNA VITALS  11/16/2020 1136 - 11/16/2020 1206      11/16/2020             Pulse:  53    Ht Rate:  (!) 49    SpO2:  99 %    Resp Rate (set):  10                Electronically Signed By: ISAMAR Ross CRNA  November 16, 2020  12:06 PM

## 2020-11-24 ENCOUNTER — OFFICE VISIT (OUTPATIENT)
Dept: OPHTHALMOLOGY | Facility: CLINIC | Age: 55
End: 2020-11-24
Attending: OPHTHALMOLOGY
Payer: COMMERCIAL

## 2020-11-24 DIAGNOSIS — Z98.890 POST-OPERATIVE STATE: Primary | ICD-10-CM

## 2020-11-24 DIAGNOSIS — L72.0: ICD-10-CM

## 2020-11-24 PROCEDURE — G0463 HOSPITAL OUTPT CLINIC VISIT: HCPCS

## 2020-11-24 PROCEDURE — 99024 POSTOP FOLLOW-UP VISIT: CPT | Performed by: OPHTHALMOLOGY

## 2020-11-24 ASSESSMENT — EXTERNAL EXAM - LEFT EYE: OS_EXAM: NORMAL

## 2020-11-24 ASSESSMENT — EXTERNAL EXAM - RIGHT EYE: OD_EXAM: NORMAL

## 2020-11-24 ASSESSMENT — SLIT LAMP EXAM - LIDS
COMMENTS: NORMAL
COMMENTS: NORMAL

## 2020-11-24 ASSESSMENT — VISUAL ACUITY
METHOD: SNELLEN - LINEAR
OD_SC: 20/20
OS_SC: 20/20

## 2020-11-24 ASSESSMENT — TONOMETRY
IOP_METHOD: ICARE
OD_IOP_MMHG: 12
OS_IOP_MMHG: 16

## 2020-11-24 NOTE — NURSING NOTE
Chief Complaints and History of Present Illnesses   Patient presents with     Post Op (Ophthalmology) Right Eye     Post Excision of epithelial inclusion cysts of conjunctiva , right eye 11/16/2020     Chief Complaint(s) and History of Present Illness(es)     Post Op (Ophthalmology) Right Eye     Laterality: right eye    Course: gradually improving    Associated symptoms: redness and itching.  Negative for eye pain and tearing    Treatments tried: eye drops and ointment    Pain scale: 0/10    Comments: Post Excision of epithelial inclusion cysts of conjunctiva , right eye 11/16/2020              Comments     Patient returns for an 8 day post operative exam.  A few days ago and then again this morning, she had a little hard particle come from her right eye and then had bloody tears when she dabbed a kleenex to her eye.  She wonders if this was a stitch coming out.    She is not having any pain or discomfort at this point other than intermittent itching.    Rubi Huynh, COT 2:05 PM  November 24, 2020

## 2020-11-24 NOTE — PROGRESS NOTES
Chief Complaint(s) and History of Present Illness(es)     Post Op (Ophthalmology) Right Eye     Laterality: right eye    Course: gradually improving    Associated symptoms: redness, itching and discharge (crusting on lashes   when she wakes).  Negative for eye pain and tearing    Treatments tried: eye drops and ointment    Pain scale: 0/10    Comments: Post Excision of epithelial inclusion cysts of conjunctiva ,   right eye 11/16/2020              Comments     Patient returns for an 8 day post operative exam.  A few days ago and   then again this morning, she had a little hard particle come from her   right eye and then had bloody tears when she dabbed a kleenex to her eye.    She wonders if this was a stitch coming out.    She is not having any pain or discomfort at this point other than   intermittent itching.    Rubi Huynh, COT 2:05 PM  November 24, 2020               Review of systems for the eyes was negative other than the pertinent positives/negatives listed in the HPI.      Assessment & Plan      Sharri Watson is a 55 year old female with the following diagnoses:   1. Post-operative state    2. Epithelial inclusion cyst of conjunctiva - Right Eye         Postoperative week 1 s/p conj cysts excision  Doing well  Ok to resume normal activities  Artificial tears four times a day and as needed   Ointment as needed       Patient disposition:   Return in about 1 year (around 11/24/2021) for DFE.           Attending Physician Attestation:  Complete documentation of historical and exam elements from today's encounter can be found in the full encounter summary report (not reduplicated in this progress note).  I personally obtained the chief complaint(s) and history of present illness.  I confirmed and edited as necessary the review of systems, past medical/surgical history, family history, social history, and examination findings as documented by others; and I examined the patient myself.  I personally  reviewed the relevant tests, images, and reports as documented above.  I formulated and edited as necessary the assessment and plan and discussed the findings and management plan with the patient and family. . - Jan Joseph MD

## 2021-12-07 ENCOUNTER — OFFICE VISIT (OUTPATIENT)
Dept: OPHTHALMOLOGY | Facility: CLINIC | Age: 56
End: 2021-12-07
Attending: OPHTHALMOLOGY
Payer: COMMERCIAL

## 2021-12-07 DIAGNOSIS — H52.4 PRESBYOPIA: ICD-10-CM

## 2021-12-07 DIAGNOSIS — H52.13 MYOPIC ASTIGMATISM OF BOTH EYES: ICD-10-CM

## 2021-12-07 DIAGNOSIS — H40.003 GLAUCOMA SUSPECT OF BOTH EYES: Primary | ICD-10-CM

## 2021-12-07 DIAGNOSIS — H52.203 MYOPIC ASTIGMATISM OF BOTH EYES: ICD-10-CM

## 2021-12-07 DIAGNOSIS — H11.441 CONJUNCTIVAL CYSTS, RIGHT EYE: ICD-10-CM

## 2021-12-07 PROCEDURE — 92014 COMPRE OPH EXAM EST PT 1/>: CPT | Performed by: OPHTHALMOLOGY

## 2021-12-07 PROCEDURE — 92133 CPTRZD OPH DX IMG PST SGM ON: CPT | Performed by: OPHTHALMOLOGY

## 2021-12-07 PROCEDURE — G0463 HOSPITAL OUTPT CLINIC VISIT: HCPCS | Mod: 25

## 2021-12-07 PROCEDURE — 92083 EXTENDED VISUAL FIELD XM: CPT | Performed by: OPHTHALMOLOGY

## 2021-12-07 RX ORDER — PROGESTERONE 200 MG/1
200 CAPSULE ORAL
COMMUNITY
Start: 2021-11-05

## 2021-12-07 RX ORDER — FINASTERIDE 5 MG/1
5 TABLET, FILM COATED ORAL
COMMUNITY
Start: 2021-11-05

## 2021-12-07 ASSESSMENT — CONF VISUAL FIELD
OD_NORMAL: 1
OS_NORMAL: 1
METHOD: COUNTING FINGERS

## 2021-12-07 ASSESSMENT — TONOMETRY
IOP_METHOD: TONOPEN
OS_IOP_MMHG: 22
OD_IOP_MMHG: 14
IOP_METHOD: APPLANATION
OS_IOP_MMHG: 20
OD_IOP_MMHG: 18

## 2021-12-07 ASSESSMENT — REFRACTION_WEARINGRX
OD_CYLINDER: SPHERE
SPECS_TYPE: OTC READERS
OS_CYLINDER: SPHERE
OD_SPHERE: +1.75
OS_SPHERE: +1.75

## 2021-12-07 ASSESSMENT — SLIT LAMP EXAM - LIDS
COMMENTS: NORMAL
COMMENTS: NORMAL

## 2021-12-07 ASSESSMENT — EXTERNAL EXAM - RIGHT EYE: OD_EXAM: NORMAL

## 2021-12-07 ASSESSMENT — EXTERNAL EXAM - LEFT EYE: OS_EXAM: NORMAL

## 2021-12-07 ASSESSMENT — CUP TO DISC RATIO
OD_RATIO: 0.4
OS_RATIO: 0.65

## 2021-12-07 ASSESSMENT — VISUAL ACUITY
OS_SC: 20/20
OD_SC+: +2
OS_SC+: -2
METHOD: SNELLEN - LINEAR
OD_SC: 20/25

## 2021-12-07 NOTE — NURSING NOTE
"Chief Complaints and History of Present Illnesses   Patient presents with     Glaucoma Suspect Evaluation     Chief Complaint(s) and History of Present Illness(es)     Glaucoma Suspect Evaluation     Laterality: both eyes    Associated symptoms: dryness (only occasionally; using eye drops as needed; drops help).  Negative for floaters, flashes, itching, eye pain, redness and tearing    Pain scale: 0/10              Comments     1 year follow up for Glaucoma suspect each eye with testing today.   Patient states vision is good each eye at distance, blurry each eye at near. Denies changes within the last year. \"I need reading glasses for reading. I use a +1.75 readers.\" Denies floaters or flashes. Patient notes some dryness. \"I had a cyst drained last year. I would like that looked at today too.\"     Kelly Jose, COT 9:09 AM 12/07/2021                  "

## 2021-12-07 NOTE — PROGRESS NOTES
"HPI     Glaucoma Suspect Evaluation     In both eyes.  Associated symptoms include dryness (only occasionally; using eye drops as needed; drops help).  Negative for floaters, flashes, itching, eye pain, redness and tearing.  Pain was noted as 0/10.              Comments     1 year follow up for Glaucoma suspect each eye with testing today.   Patient states vision is good each eye at distance, blurry each eye at near. Denies changes within the last year. \"I need reading glasses for reading. I use a +1.75 readers.\" Denies floaters or flashes. Patient notes some dryness. \"I had a cyst drained last year. I would like that looked at today too.\"     Kelly Jose, GONZALO 9:09 AM 12/07/2021            Last edited by Kelly Jose on 12/7/2021  9:09 AM. 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  (primary encounter diagnosis)  Comment: Based on cup to disc asymmetry, left eye cup larger    FH: + maternal grandmother  Pachymetry: 546/546 (2020)  Gonioscopy:  Tmax: Mid/high teens both eyes   Today's IOP: 18/22  Target IOP:  Current medications: None  Meds to avoid:   Visual field: OVF 24-2 (12/7/21)  right eye: Inferior depression, MD -2.3, PSD 3.0  left eye: Generalized depression, pretty full on pattern, MD -3.6, PSD 2.4  Nerve OCT: Spectralis optic nerve OCT (12/7/21)  OD: Central RNFL thickness 91, all green   OS: Central RNFL thickness 96, all green     Mildly elevated IOP left eye today, but with reassuring RNFL thickness. Generalized depression on OVF, but no glaucomatous defects.    Plan: Continue to monitor. Repeat OVF and nerve OCT in 1 year.    (H11.441) Conjunctival cysts, right eye  Comment: S/p excision in 2020. Doing well  Plan: Monitor    (H52.203,  H52.13) Myopic astigmatism of both eyes  (H52.4) Presbyopia  Comment: Doing well with OTC readers  Plan: Monitor    Return in about 1 year (around 12/7/2022) for applanation IOP, OVF 24-2, dilation, nerve OCT OU, or sooner as " needed.    Teaching statement:  Complete documentation of historical and exam elements from today's encounter can be found in the full encounter summary report (not reduplicated in this progress note). I personally obtained the chief complaint(s) and history of present illness.  I confirmed and edited as necessary the review of systems, past medical/surgical history, family history, social history, and examination findings as documented by others; and I examined the patient myself. I personally reviewed the relevant tests, images, and reports as documented above.     I formulated and edited as necessary the assessment and plan and discussed the findings and management plan with the patient and family.    Kassandra Manning MD  Comprehensive Ophthalmology & Ocular Pathology  Department of Ophthalmology and Visual Neurosciences  [email or web address]  Pager [phone]

## 2022-09-27 ENCOUNTER — VIRTUAL VISIT (OUTPATIENT)
Dept: UROLOGY | Facility: CLINIC | Age: 57
End: 2022-09-27
Payer: COMMERCIAL

## 2022-09-27 VITALS — BODY MASS INDEX: 20.09 KG/M2 | HEIGHT: 66 IN | WEIGHT: 125 LBS

## 2022-09-27 DIAGNOSIS — N39.46 MIXED INCONTINENCE: ICD-10-CM

## 2022-09-27 DIAGNOSIS — R31.0 GROSS HEMATURIA: Primary | ICD-10-CM

## 2022-09-27 DIAGNOSIS — R39.15 URINARY URGENCY: ICD-10-CM

## 2022-09-27 PROCEDURE — 99203 OFFICE O/P NEW LOW 30 MIN: CPT | Mod: 95 | Performed by: PHYSICIAN ASSISTANT

## 2022-09-27 ASSESSMENT — PAIN SCALES - GENERAL: PAINLEVEL: NO PAIN (0)

## 2022-09-27 NOTE — PATIENT INSTRUCTIONS
- Leave urine sample for urine cytology  - You will get scheduled for CT urogram and cystoscopy with Dr. Maldonado or Dr. Lima.   __________________________    Below is a list of things that can irritate the bladder and should be eliminated:    Caffeinated soft drinks.  Coffee.  Tea.  Chocolate.  Tomato-based foods.  Acidic juices and fruits. (includes cranberry juice)  Alcohol.  Nicotine  Carbonated drinks.  Aspartame/Nutrasweet.      CYSTOSCOPY    What is a Cystoscopy?  This is a procedure done to check for problems inside the bladder.  Problems may include polyps (growths), tumors, inflammation (swelling and redness) and other concerns.    The doctor inserts a thin tube (called a cystoscope) into the bladder.  The tube is about the size of a pencil.  We will give you numbing medicine to reduce the pain or discomfort you may feel.    The tube allows the doctor to:  The doctor will be able to see inside the bladder by filling the bladder with water.  The water makes it easier to see any problems that may be present.    If needed, the doctor may use the tube to:  The doctor is able to take tissue samples (biopsies).  Samples are sent to the lab for testing.  The doctor can also burn off any small growths or tumors that are found.  This is call fulguration.    What happens after the exam?  You may go back to your normal diet and activity as you feel ready, unless your doctor tells you not to.    For the next two days, you may notice:  Some blood in your urine.  Some burning when you urinate (use the toilet).  An urge to urinate more often.  Bladder spasms.    These are normal after the procedure. They should go away on their own after a day or two.      You can help to relieve the above listed symptoms by:  Drinking 6 to 8 large glasses of water each day (includes drinks at meals).  This will help clear the urine.  Take warm baths to relieve pain and bladder spasms.  Do not add anything to the bath water.  Your doctor  may prescribe pain medicine.  You may also take Tylenol (acetaminophen) for pain.    When should I call my doctor?  A fever over 100.0 F (38 C) for more than a day.  (Before you call the doctor, check your temperature under your tongue.)  Chills.  Failure to urinate: No urine comes out when you try to use the toilet.  (Try soaking in a bathtub full of warm water.  If still no urine, call your doctor.)  A lot of blood in the urine or blood clots larger than a nickel.  Pain in the back or abdomen (belly / stomach area).  Pain or spasms that are not relieved by warm tub baths and pain medicine.  Severe pain, burning or other problems while passing urine.  Pain that gets worse after two days.

## 2022-09-27 NOTE — LETTER
9/27/2022       RE: Sharri Watson  [id number] [facility] [address]     Dear Colleague,    Thank you for referring your patient, Sharri Watson, to the [facility] at [facility]. Please see a copy of my visit note below.    Urology Clinic    Name: Sharri Watson    MRN: [id number]   YOB: 1965  Accompanied at today's visit by:self               Chief Complaint:   Gross hematuria          History of Present Illness:   September 27, 2022    HISTORY:   Sharri Watson is a 57 year old female as a new consultation for concerns of intermittent gross hematuria that started last 3/2022. Did see her OB/GYN Dr. Joan Ambrose from [facility] with a work-up that included TVUS, STI testing and vaginal swab that ruled out infection. States the TVUS and all of her other tests were normal. Do not have these records. Her OB/GYN referred her to urology as does not think patient is having vaginal bleeding. Patient agrees with her OB/GYN stating she notices blood when she wipes along the urethra at times and thinks the blood is coming from her urine. Describes intermittent pink urine. Denies passing clots in her urine.  Denies UTI symptoms, fevers/chills, back pain associated with gross hematuria. Not on any blood thinners. Denies history of kidney stones. Does have history of mesh sling placed 13 years ago for NENA. Does hormone replacements with implanted pellets for her menopausal symptoms. Not using estrogen cream. Has not had any recent UTIs over the past year. Reports very rare NENA with coughing. Also reports UUI. Voiding every 2 hours during the day. Drinks a lot of caffeine daily. Does not wear pads. Has tried Emsella and Vaginal Laser therapy x4 treatments  for her urinary incontinence. Never smoker. Patient voices no other concerns at this time.           Past Medical History:     Past Medical History:   Diagnosis  "Date     Complication of anesthesia      Epithelial inclusion cyst of conjunctiva             Past Surgical History:     Past Surgical History:   Procedure Laterality Date     AS HYSTEROSCOPY, SURGICAL; W/ ENDOMETRIAL ABLATION, ANY METHOD       BLADDER SURGERY       DILATION AND CURETTAGE       EXCISE LESION CONJUNCTIVAL Right 11/16/2020    Procedure: EXCISION,  CONJUNCTIVA CYSTS;  Surgeon: Jan Joseph MD;  Location: [facility] OR     CO BREAST AUGMENTATION       TUBAL LIGATION              Social History:     Social History     Tobacco Use     Smoking status: Never Smoker     Smokeless tobacco: Never Used   Substance Use Topics     Alcohol use: Not on file            Family History:     Family History   Problem Relation Age of Onset     Macular Degeneration Mother      Macular Degeneration Father      Glaucoma Maternal Grandmother               Allergies:     Allergies   Allergen Reactions     Codeine Nausea and Vomiting            Medications:     Current Outpatient Medications   Medication Sig     finasteride (PROSCAR) 5 MG tablet Take 5 mg by mouth     Multiple Vitamins-Minerals (EYE VITAMINS) CAPS Take 1 tablet by mouth every morning      Multiple Vitamins-Minerals (MULTIVITAMIN ADULT PO) Take 1 tablet by mouth every morning      progesterone (PROMETRIUM) 200 MG capsule Take 200 mg by mouth     neomycin-polymyxin-dexamethasone (MAXITROL) 3.5-29121-0.1 ophthalmic ointment Apply 0.5 inches to eye 3 times daily (Patient not taking: No sig reported)     ofloxacin (OCUFLOX) 0.3 % ophthalmic solution Apply 1 drop to eye 3 times daily To operative eye (Patient not taking: No sig reported)     No current facility-administered medications for this visit.             Review of Systems:    ROS: 14 point ROS neg other than the symptoms noted above in the HPI.          Physical Exam:   Height 1.676 m (5' 6\"), weight 56.7 kg (125 lb).  5' 6\", Body mass index is 20.18 kg/m ., 125 lbs 0 oz  Gen appearance: Age-appropriate " appearing female in NAD.   HEENT:  EOMI. Normal ROM of neck for age observed.   Psych:  alert , In no acute distress.  Neuro:  A&Ox3         Assessment and Plan:   57 year old female with intermittent gross hematuria since 3/2022, LAYLA, urinary urgency.     Orders Placed This Encounter   Procedures     CT Urogram wo & w Contrast     UA reflex to Microscopic and Culture [[id number]]     - Will order UA, urine cytology, CT urogram. Patient to follow-up with Dr. Lima or Dr. Maldonado for cystoscopy and exam to rule out mesh erosion or other etiologies that could be contributing to gross hematuria symptoms.  - Will have RN staff obtain records from OB/GYN.  -  Discussed avoiding bladder irritants.    -  Patient's LAYLA and urinary urgency symptoms not overly bothersome and would like to hold off further treatment for now.     After discussing the assessment and plan with patient, patient verbalized understanding and agreed to the above plan. All questions answered.     25 minutes were spent today on the date of the encounter in reviewing the EMR, direct patient care, coordination of care and documentation.    Jade Dorsey PA-C  September 27, 2022    Patient Care Team:  Shivam Mukherjee MD as PCP - General  Jan Joseph MD as MD (Ophthalmology)  Alisha Cosby Chi, OD as MD (Optometry)  Kassandra Manning MD as MD (Ophthalmology)  Kassandra Manning MD as Assigned Surgical Provider  SELF, REFERRED         SEND LINK TO CELL PHONE    PT LAST HAD GROSS HEM LAST Thursday  PT HAS HAD GROSS HEM FOR A FEW MONTHS  PT HAS VAG SLING ABOUT 13 YEARS AGO  PT HAD VAG TIGHTINING WITH A LASER LAST YEAR    Sharri is a 57 year old who is being evaluated via a billable video visit.      How would you like to obtain your AVS? MyChart  If the video visit is dropped, the invitation should be resent by: Text to cell phone: [phone]  Will anyone else be joining your video visit? No        Video-Visit Details    Video Start Time: 9:47  AM  Type of service:  Video Visit  Video End Time:10:05 am  Originating Location (pt. Location): Home  Distant Location (provider location):  [facility] [place]   Platform used for Video Visit: [employer] Mobilemartha

## 2022-09-27 NOTE — PROGRESS NOTES
Urology Clinic    Name: Sharri Watson    MRN: [id number]   YOB: 1965  Accompanied at today's visit by:self               Chief Complaint:   Gross hematuria          History of Present Illness:   September 27, 2022    HISTORY:   Sharri Watson is a 57 year old female as a new consultation for concerns of intermittent gross hematuria that started last 3/2022. Did see her OB/GYN Dr. Joan Ambrose from [facility] with a work-up that included TVUS, STI testing and vaginal swab that ruled out infection. States the TVUS and all of her other tests were normal. Do not have these records. Her OB/GYN referred her to urology as does not think patient is having vaginal bleeding. Patient agrees with her OB/GYN stating she notices blood when she wipes along the urethra at times and thinks the blood is coming from her urine. Describes intermittent pink urine. Denies passing clots in her urine.  Denies UTI symptoms, fevers/chills, back pain associated with gross hematuria. Not on any blood thinners. Denies history of kidney stones. Does have history of mesh sling placed 13 years ago for NENA. Does hormone replacements with implanted pellets for her menopausal symptoms. Not using estrogen cream. Has not had any recent UTIs over the past year. Reports very rare NENA with coughing. Also reports UUI. Voiding every 2 hours during the day. Drinks a lot of caffeine daily. Does not wear pads. Has tried Emsella and Vaginal Laser therapy x4 treatments  for her urinary incontinence. Never smoker. Patient voices no other concerns at this time.           Past Medical History:     Past Medical History:   Diagnosis Date     Complication of anesthesia      Epithelial inclusion cyst of conjunctiva             Past Surgical History:     Past Surgical History:   Procedure Laterality Date     AS HYSTEROSCOPY, SURGICAL; W/ ENDOMETRIAL ABLATION, ANY METHOD       BLADDER SURGERY       DILATION AND CURETTAGE       EXCISE  "LESION CONJUNCTIVAL Right 11/16/2020    Procedure: EXCISION,  CONJUNCTIVA CYSTS;  Surgeon: Jan Joseph MD;  Location: [facility] OR     AK BREAST AUGMENTATION       TUBAL LIGATION              Social History:     Social History     Tobacco Use     Smoking status: Never Smoker     Smokeless tobacco: Never Used   Substance Use Topics     Alcohol use: Not on file            Family History:     Family History   Problem Relation Age of Onset     Macular Degeneration Mother      Macular Degeneration Father      Glaucoma Maternal Grandmother               Allergies:     Allergies   Allergen Reactions     Codeine Nausea and Vomiting            Medications:     Current Outpatient Medications   Medication Sig     finasteride (PROSCAR) 5 MG tablet Take 5 mg by mouth     Multiple Vitamins-Minerals (EYE VITAMINS) CAPS Take 1 tablet by mouth every morning      Multiple Vitamins-Minerals (MULTIVITAMIN ADULT PO) Take 1 tablet by mouth every morning      progesterone (PROMETRIUM) 200 MG capsule Take 200 mg by mouth     neomycin-polymyxin-dexamethasone (MAXITROL) 3.5-83305-1.1 ophthalmic ointment Apply 0.5 inches to eye 3 times daily (Patient not taking: No sig reported)     ofloxacin (OCUFLOX) 0.3 % ophthalmic solution Apply 1 drop to eye 3 times daily To operative eye (Patient not taking: No sig reported)     No current facility-administered medications for this visit.             Review of Systems:    ROS: 14 point ROS neg other than the symptoms noted above in the HPI.          Physical Exam:   Height 1.676 m (5' 6\"), weight 56.7 kg (125 lb).  5' 6\", Body mass index is 20.18 kg/m ., 125 lbs 0 oz  Gen appearance: Age-appropriate appearing female in NAD.   HEENT:  EOMI. Normal ROM of neck for age observed.   Psych:  alert , In no acute distress.  Neuro:  A&Ox3         Assessment and Plan:   57 year old female with intermittent gross hematuria since 3/2022, LAYLA, urinary urgency.     Orders Placed This Encounter   Procedures     " CT Urogram wo & w Contrast     UA reflex to Microscopic and Culture [[id number]]     - Will order UA, urine cytology, CT urogram. Patient to follow-up with Dr. Lima or Dr. Maldonado for cystoscopy and exam to rule out mesh erosion or other etiologies that could be contributing to gross hematuria symptoms.  - Will have RN staff obtain records from OB/GYN.  -  Discussed avoiding bladder irritants.    -  Patient's LAYLA and urinary urgency symptoms not overly bothersome and would like to hold off further treatment for now.     After discussing the assessment and plan with patient, patient verbalized understanding and agreed to the above plan. All questions answered.     25 minutes were spent today on the date of the encounter in reviewing the EMR, direct patient care, coordination of care and documentation.    Jade Dorsey PA-C  September 27, 2022    Patient Care Team:  Shivam Mukherjee MD as PCP - General  Jan Joseph MD as MD (Ophthalmology)  Alisha Cosby Chi, OD as MD (Optometry)  Kassandra Manning MD as MD (Ophthalmology)  Kassandra Manning MD as Assigned Surgical Provider  SELF, REFERRED         SEND LINK TO CELL PHONE    PT LAST HAD GROSS HEM LAST Thursday  PT HAS HAD GROSS HEM FOR A FEW MONTHS  PT HAS VAG SLING ABOUT 13 YEARS AGO  PT HAD VAG TIGHTINING WITH A LASER LAST YEAR    Sharri is a 57 year old who is being evaluated via a billable video visit.      How would you like to obtain your AVS? MyChart  If the video visit is dropped, the invitation should be resent by: Text to cell phone: [phone]  Will anyone else be joining your video visit? No        Video-Visit Details    Video Start Time: 9:47 AM    Type of service:  Video Visit    Video End Time:10:05 am    Originating Location (pt. Location): Home    Distant Location (provider location):  [facility] UROLOGY [facility]     Platform used for Video Visit: Thr[employer] On

## 2022-09-28 ENCOUNTER — TELEPHONE (OUTPATIENT)
Dept: UROLOGY | Facility: CLINIC | Age: 57
End: 2022-09-28

## 2022-09-28 NOTE — TELEPHONE ENCOUNTER
----- Message from Jade Dorsey PA-C sent at 9/27/2022 10:05 AM CDT -----  Please coordinate with patient how/when to leave UA and urine cytology. Needs CT urogram next available followed by cystoscopy with exam with Dr. Maldonado or Dr. Lima next available.

## 2022-09-30 ENCOUNTER — LAB (OUTPATIENT)
Dept: LAB | Facility: CLINIC | Age: 57
End: 2022-09-30
Payer: COMMERCIAL

## 2022-09-30 DIAGNOSIS — N39.0 URINARY TRACT INFECTION: ICD-10-CM

## 2022-09-30 DIAGNOSIS — Z11.59 ENCOUNTER FOR SCREENING FOR OTHER VIRAL DISEASES: Primary | ICD-10-CM

## 2022-09-30 LAB
ALBUMIN UR-MCNC: NEGATIVE MG/DL
APPEARANCE UR: CLEAR
BACTERIA #/AREA URNS HPF: ABNORMAL /HPF
BILIRUB UR QL STRIP: NEGATIVE
COLOR UR AUTO: YELLOW
GLUCOSE UR STRIP-MCNC: NEGATIVE MG/DL
HGB UR QL STRIP: NEGATIVE
KETONES UR STRIP-MCNC: NEGATIVE MG/DL
LEUKOCYTE ESTERASE UR QL STRIP: ABNORMAL
NITRATE UR QL: NEGATIVE
PH UR STRIP: 6 [PH] (ref 5–7)
RBC #/AREA URNS AUTO: ABNORMAL /HPF
SP GR UR STRIP: 1.02 (ref 1–1.03)
SQUAMOUS #/AREA URNS AUTO: ABNORMAL /LPF
UROBILINOGEN UR STRIP-ACNC: 0.2 E.U./DL
WBC #/AREA URNS AUTO: ABNORMAL /HPF

## 2022-09-30 PROCEDURE — 88112 CYTOPATH CELL ENHANCE TECH: CPT | Performed by: PATHOLOGY

## 2022-09-30 PROCEDURE — 81001 URINALYSIS AUTO W/SCOPE: CPT

## 2022-09-30 PROCEDURE — 87086 URINE CULTURE/COLONY COUNT: CPT

## 2022-10-01 LAB — BACTERIA UR CULT: NO GROWTH

## 2022-10-03 LAB
PATH REPORT.COMMENTS IMP SPEC: ABNORMAL
PATH REPORT.COMMENTS IMP SPEC: YES
PATH REPORT.FINAL DX SPEC: ABNORMAL
PATH REPORT.GROSS SPEC: ABNORMAL
PATH REPORT.MICROSCOPIC SPEC OTHER STN: ABNORMAL
PATH REPORT.RELEVANT HX SPEC: ABNORMAL

## 2022-10-05 ENCOUNTER — ANCILLARY PROCEDURE (OUTPATIENT)
Dept: CT IMAGING | Facility: CLINIC | Age: 57
End: 2022-10-05
Attending: PHYSICIAN ASSISTANT
Payer: COMMERCIAL

## 2022-10-05 DIAGNOSIS — R31.0 GROSS HEMATURIA: ICD-10-CM

## 2022-10-05 PROCEDURE — 255N000002 HC RX 255 OP 636: Performed by: PHYSICIAN ASSISTANT

## 2022-10-05 PROCEDURE — 74178 CT ABD&PLV WO CNTR FLWD CNTR: CPT

## 2022-10-05 RX ADMIN — IOHEXOL 100 ML: 350 INJECTION, SOLUTION INTRAVENOUS at 14:08

## 2023-04-08 ENCOUNTER — HEALTH MAINTENANCE LETTER (OUTPATIENT)
Age: 58
End: 2023-04-08

## 2023-04-11 DIAGNOSIS — H40.003 GLAUCOMA SUSPECT OF BOTH EYES: Primary | ICD-10-CM

## 2023-04-11 PROBLEM — D80.2 IGA DEFICIENCY (H): Status: ACTIVE | Noted: 2021-08-06

## 2023-04-11 PROBLEM — M54.42 ACUTE LEFT-SIDED LOW BACK PAIN WITH LEFT-SIDED SCIATICA: Status: ACTIVE | Noted: 2020-06-18

## 2023-04-11 PROBLEM — R19.7 DIARRHEA: Status: ACTIVE | Noted: 2022-01-03

## 2023-04-12 NOTE — PROGRESS NOTES
HPI:  Patient presents for an annual exam - follows glaucoma suspect.     Social history: walks her dogs, works at a [employer].       Pertinent Medical History:    IgA deficiency    Ocular History:    Glaucoma suspect both eyes.     Conjunctival Cyst, right eye. S/P excision 11/16/2020.     Eye Medications:    None    Assessment and Plan:  1.   Glaucoma suspect due to family history (MGM) and cupping, left eye > right eye.     Pach's: 546/546    Tmax: 17/20    IOP today: 13/13    Visual field 04/21/2023: Both eyes: no glaucomatous defects.     Optic nerve OCT 04/21/2023: Both eyes: no rnfl defects    Visual field and optic nerve OCT in 1 year.     2.   History of Conjunctival Cyst, right eye. Inactive.     S/P excision 11/16/2020    3.   Myopia, both eyes.     Happy with over the counter readers.     4.   Cataract, both eyes.     Not visually significant. Monitor.     5.   Probable Small Hemangioma on Caruncle vs enlarged vessel normal variant, left eye.     Slit lamp photo today 04/21/2023    Recommend initial consultation with oculoplastics.     6.   Macular Pigmentary changes, left eye.     Family history of macular degeneration - father.     Macular OCT 04/21/2023: Right eye: normal; Left eye: rpe changes.     Recommend follow up in the retina service in 6 months for initial consult. I am happy to see Sharri every year with dilation and macular OCT after that.         Patient consented to a dilated eye exam:    Yes. Side effects discussed.    Medical History:  Past Medical History:   Diagnosis Date     Complication of anesthesia      Epithelial inclusion cyst of conjunctiva        Medications:  Current Outpatient Medications   Medication Sig Dispense Refill     finasteride (PROSCAR) 5 MG tablet Take 5 mg by mouth       Multiple Vitamins-Minerals (EYE VITAMINS) CAPS Take 1 tablet by mouth every morning        Multiple Vitamins-Minerals (MULTIVITAMIN ADULT PO) Take 1 tablet by mouth every morning         neomycin-polymyxin-dexamethasone (MAXITROL) 3.5-33686-6.1 ophthalmic ointment Apply 0.5 inches to eye 3 times daily (Patient not taking: No sig reported) 1 Tube 1     ofloxacin (OCUFLOX) 0.3 % ophthalmic solution Apply 1 drop to eye 3 times daily To operative eye (Patient not taking: No sig reported) 1 Bottle 0     progesterone (PROMETRIUM) 200 MG capsule Take 200 mg by mouth     Complete documentation of historical and exam elements from today's encounter can be found in the full encounter summary report (not reduplicated in this progress note). I personally obtained the chief complaint(s) and history of present illness.  I confirmed and edited as necessary the review of systems, past medical/surgical history, family history, social history, and examination findings as documented by others; and I examined the patient myself. I personally reviewed the relevant tests, images, and reports as documented above. I formulated and edited as necessary the assessment and plan and discussed the findings and management plan with the patient and family. - Alisha Gracia, DEMETRI

## 2023-04-21 ENCOUNTER — OFFICE VISIT (OUTPATIENT)
Dept: OPHTHALMOLOGY | Facility: CLINIC | Age: 58
End: 2023-04-21
Attending: OPTOMETRIST
Payer: COMMERCIAL

## 2023-04-21 DIAGNOSIS — H35.89 RPE MOTTLING OF MACULA: Primary | ICD-10-CM

## 2023-04-21 DIAGNOSIS — H11.431: ICD-10-CM

## 2023-04-21 DIAGNOSIS — H40.003 GLAUCOMA SUSPECT OF BOTH EYES: ICD-10-CM

## 2023-04-21 DIAGNOSIS — H25.13 NUCLEAR SENILE CATARACT OF BOTH EYES: ICD-10-CM

## 2023-04-21 PROCEDURE — 92083 EXTENDED VISUAL FIELD XM: CPT | Performed by: OPTOMETRIST

## 2023-04-21 PROCEDURE — 92134 CPTRZ OPH DX IMG PST SGM RTA: CPT | Performed by: OPTOMETRIST

## 2023-04-21 PROCEDURE — 92250 FUNDUS PHOTOGRAPHY W/I&R: CPT | Performed by: OPTOMETRIST

## 2023-04-21 PROCEDURE — 92285 EXTERNAL OCULAR PHOTOGRAPHY: CPT | Performed by: OPTOMETRIST

## 2023-04-21 PROCEDURE — 92133 CPTRZD OPH DX IMG PST SGM ON: CPT | Performed by: OPTOMETRIST

## 2023-04-21 PROCEDURE — 99207 FUNDUS PHOTOS OU (BOTH EYES): CPT | Mod: 26 | Performed by: OPTOMETRIST

## 2023-04-21 PROCEDURE — 99207 OCT OPTIC NERVE RNFL SPECTRALIS OU (BOTH EYES): CPT | Mod: 26 | Performed by: OPTOMETRIST

## 2023-04-21 PROCEDURE — G0463 HOSPITAL OUTPT CLINIC VISIT: HCPCS | Mod: 25 | Performed by: OPTOMETRIST

## 2023-04-21 PROCEDURE — 92004 COMPRE OPH EXAM NEW PT 1/>: CPT | Performed by: OPTOMETRIST

## 2023-04-21 ASSESSMENT — EXTERNAL EXAM - LEFT EYE: OS_EXAM: NORMAL

## 2023-04-21 ASSESSMENT — SLIT LAMP EXAM - LIDS
COMMENTS: NORMAL
COMMENTS: NORMAL

## 2023-04-21 ASSESSMENT — VISUAL ACUITY
OD_CC+: -1
METHOD_MR: PT DEFERRED
OD_CC: 20/20
OS_CC: 20/20
METHOD: SNELLEN - LINEAR

## 2023-04-21 ASSESSMENT — TONOMETRY
IOP_METHOD: TONOPEN
OS_IOP_MMHG: 13
OD_IOP_MMHG: 13

## 2023-04-21 ASSESSMENT — PACHYMETRY
OS_CT(UM): 546
OD_CT(UM): 546

## 2023-04-21 ASSESSMENT — CUP TO DISC RATIO
OD_RATIO: 0.5
OS_RATIO: 0.65

## 2023-04-21 ASSESSMENT — CONF VISUAL FIELD: COMMENTS: VF TODAY

## 2023-04-21 ASSESSMENT — EXTERNAL EXAM - RIGHT EYE: OD_EXAM: NORMAL

## 2023-04-21 NOTE — NURSING NOTE
Chief Complaints and History of Present Illnesses   Patient presents with     Follow Up     Glaucoma suspect of both eyes      Chief Complaint(s) and History of Present Illness(es)     Follow Up            Comments: Glaucoma suspect of both eyes           Comments    Pt states no change in VA since last visit  States no flashes, floaters, eye pain or redness    Leyda Joseph COT 2:18 PM April 21, 2023

## 2023-04-28 NOTE — TELEPHONE ENCOUNTER
FUTURE VISIT INFORMATION      FUTURE VISIT INFORMATION:    Date: 6/13/23    Time: 2:00pm    Location: [facility]  REFERRAL INFORMATION:    Referring provider:  Alisha Cosby Chi, OD    Referring providers clinic:  [facility]    Reason for visit/diagnosis  Probable Small Hemangioma on Caruncle vs enlarged vessel normal variant, left eye    RECORDS REQUESTED FROM:       Clinic name Comments Records Status Imaging Status   [facility] OV/notes 4/21/23-9/17/20 EPIC

## 2023-06-13 ENCOUNTER — OFFICE VISIT (OUTPATIENT)
Dept: OPHTHALMOLOGY | Facility: CLINIC | Age: 58
End: 2023-06-13
Payer: COMMERCIAL

## 2023-06-13 ENCOUNTER — PRE VISIT (OUTPATIENT)
Dept: OPHTHALMOLOGY | Facility: CLINIC | Age: 58
End: 2023-06-13

## 2023-06-13 DIAGNOSIS — Q27.9 VASCULAR MALFORMATION: Primary | ICD-10-CM

## 2023-06-13 PROCEDURE — 99213 OFFICE O/P EST LOW 20 MIN: CPT | Mod: GC | Performed by: OPHTHALMOLOGY

## 2023-06-13 ASSESSMENT — TONOMETRY
OD_IOP_MMHG: 23
OS_IOP_MMHG: 23
IOP_METHOD: ICARE

## 2023-06-13 ASSESSMENT — VISUAL ACUITY
OD_SC+: -1
OD_SC: 20/20
OS_SC+: -1
METHOD: SNELLEN - LINEAR
OS_SC: 20/20

## 2023-06-13 ASSESSMENT — SLIT LAMP EXAM - LIDS
COMMENTS: NORMAL
COMMENTS: NORMAL

## 2023-06-13 ASSESSMENT — EXTERNAL EXAM - RIGHT EYE: OD_EXAM: NORMAL

## 2023-06-13 ASSESSMENT — EXTERNAL EXAM - LEFT EYE: OS_EXAM: NORMAL

## 2023-06-13 NOTE — NURSING NOTE
Chief Complaints and History of Present Illnesses   Patient presents with     Consult For     Pt here for consult on Small Hemangioma/ enlarged vessel left eye , Per Dr Cosby.     Chief Complaint(s) and History of Present Illness(es)     Consult For            Associated symptoms: Negative for eye pain and headache    Comments: Pt here for consult on Small Hemangioma/ enlarged vessel left eye , Per Dr Cosby.          Comments    Pt has hx of Conjunctival Cyst, right eye. S/P excision 11/16/2020. Pt does not notice lesion.   Soniya Truong, COA on 6/13/2023 at 2:18 PM

## 2023-06-13 NOTE — LETTER
2023         RE:  :  MRN: Sharri Watson  1965  [id number]     Dear Dr. Cosby,    Thank you for asking me to see your patient, Sharri Watson, for an oculoplastic   consultation.  My assessment and plan are below.  For further details, please see my attached clinic note.      Chief Complaint(s) and History of Present Illness(es)     Consult For            Associated symptoms: Negative for eye pain and headache    Comments: Pt here for consult on Small Hemangioma/ enlarged vessel left   eye , Per Dr Cosby.          Comments    Pt has hx of Conjunctival Cyst, right eye. S/P excision 2020. Pt   does not notice lesion.   Soniya Truong, COA on 2023 at 2:18 PM                   Assessment & Plan     Sharri Watson is a 58 year old female with the following diagnoses:     ICD-10-CM    1. Vascular malformation  Q27.9           Small vascular malformation, left plica  - pt asymptomatic. No episodes of bleeding, swelling, vision changes  - continue monitoring for now  - follow up as needed    We have photos on file.     Brenda Arce MD  Oculoplastic Surgery Fellow        Again, thank you for allowing me to participate in the care of your patient.      Sincerely,    Kavya Gary MD  Department of Ophthalmology and Visual Neurosciences  [facility]    CC: Alisha Cosby, OD  [address] [address]  Via In Basket

## 2023-06-13 NOTE — PROGRESS NOTES
Chief Complaint(s) and History of Present Illness(es)     Consult For            Associated symptoms: Negative for eye pain and headache    Comments: Pt here for consult on Small Hemangioma/ enlarged vessel left   eye , Per Dr Cosby.          Comments    Pt has hx of Conjunctival Cyst, right eye. S/P excision 11/16/2020. Pt   does not notice lesion.   Soniya Truong, COA on 6/13/2023 at 2:18 PM                   Assessment & Plan     Sharri Watson is a 58 year old female with the following diagnoses:     ICD-10-CM    1. Vascular malformation  Q27.9           Small vascular malformation, left plica  - pt asymptomatic. No episodes of bleeding, swelling, vision changes  - continue monitoring for now  - follow up as needed    We have photos on file.     Brenda Arce MD  Oculoplastic Surgery Fellow       Attending Physician Attestation: Complete documentation of historical and exam elements from today's encounter can be found in the full encounter summary report (not reduplicated in this progress note). I personally obtained the chief complaint(s) and history of present illness. I confirmed and edited as necessary the review of systems, past medical/surgical history, family history, social history, and examination findings as documented by others; and I examined the patient myself. I personally reviewed the relevant tests, images, and reports as documented above. I formulated and edited as necessary the assessment and plan and discussed the findings and management plan with the patient.  -Kavya Gary MD

## 2023-10-17 DIAGNOSIS — Q27.9 VASCULAR MALFORMATION: Primary | ICD-10-CM

## 2023-10-23 ENCOUNTER — OFFICE VISIT (OUTPATIENT)
Dept: OPHTHALMOLOGY | Facility: CLINIC | Age: 58
End: 2023-10-23
Attending: OPHTHALMOLOGY
Payer: COMMERCIAL

## 2023-10-23 DIAGNOSIS — Q27.9 VASCULAR MALFORMATION: ICD-10-CM

## 2023-10-23 PROCEDURE — G0463 HOSPITAL OUTPT CLINIC VISIT: HCPCS | Mod: 25 | Performed by: OPHTHALMOLOGY

## 2023-10-23 PROCEDURE — 99213 OFFICE O/P EST LOW 20 MIN: CPT | Mod: GC | Performed by: OPHTHALMOLOGY

## 2023-10-23 PROCEDURE — 92134 CPTRZ OPH DX IMG PST SGM RTA: CPT | Performed by: OPHTHALMOLOGY

## 2023-10-23 ASSESSMENT — TONOMETRY
OS_IOP_MMHG: 14
IOP_METHOD: TONOPEN
OD_IOP_MMHG: 16

## 2023-10-23 ASSESSMENT — CONF VISUAL FIELD
OS_NORMAL: 1
OS_INFERIOR_TEMPORAL_RESTRICTION: 0
OD_INFERIOR_TEMPORAL_RESTRICTION: 0
OD_NORMAL: 1
OS_SUPERIOR_NASAL_RESTRICTION: 0
OD_INFERIOR_NASAL_RESTRICTION: 0
OS_SUPERIOR_TEMPORAL_RESTRICTION: 0
OD_SUPERIOR_TEMPORAL_RESTRICTION: 0
OD_SUPERIOR_NASAL_RESTRICTION: 0
METHOD: COUNTING FINGERS
OS_INFERIOR_NASAL_RESTRICTION: 0

## 2023-10-23 ASSESSMENT — CUP TO DISC RATIO
OS_RATIO: 0.65
OD_RATIO: 0.5

## 2023-10-23 ASSESSMENT — EXTERNAL EXAM - LEFT EYE: OS_EXAM: NORMAL

## 2023-10-23 ASSESSMENT — VISUAL ACUITY
METHOD: SNELLEN - LINEAR
OS_SC+: -2
OD_SC+: +2
OS_SC: 20/20
OD_SC: 20/20

## 2023-10-23 ASSESSMENT — REFRACTION_WEARINGRX
OS_SPHERE: +1.75
SPECS_TYPE: OTC READERS
OD_SPHERE: +1.75
OS_CYLINDER: SPHERE
OD_CYLINDER: SPHERE

## 2023-10-23 ASSESSMENT — SLIT LAMP EXAM - LIDS
COMMENTS: NORMAL
COMMENTS: NORMAL

## 2023-10-23 ASSESSMENT — EXTERNAL EXAM - RIGHT EYE: OD_EXAM: NORMAL

## 2023-10-23 NOTE — NURSING NOTE
Chief Complaints and History of Present Illnesses   Patient presents with    Retinal Evaluation      Macular Pigmentary changes, left eye.      Chief Complaint(s) and History of Present Illness(es)       Retinal Evaluation              Comments:  Macular Pigmentary changes, left eye.               Comments    Pt states vision is the same as 6 months ago. No eye pain today. No new flashes or floaters.  No redness or dryness. Pt notes getting ocular migraines every few weeks.    IRMA Piña October 23, 2023 3:12 PM

## 2023-10-23 NOTE — PROGRESS NOTES
I have confirmed the patient's and reviewed Past Medical History, Past Surgical History, Social History, Family History, Problem List, Medication List and agree with Tech note.      CC -   referral from Dr. Cosby    INTERVAL HISTORY - Initial visit    PMH -   Sharri Watson is a  58 year old year-old patient with history of IgA deficiency, who presents to retina service as a referral from Dr. Cosby.    She states no visual complaints today. She has a history of ocular migraines. No other flashes/floaters.       PAST OCULAR SURGERY  History of inclusion cyst on conjunctiva that was excised  Hx of blepharoplasty    RETINAL IMAGING:  OCT 10/23/23  OD - normal retinal architecture and foveal contour  OS - Focal disruption of ellipsoid zone, temporal macula, otherwise normal      ASSESSMENT & PLAN  # Disruption of ellipsoid zone, temporal macula, left eye   -History of AMD in father   -No drusen on OCT 10/23/23   -No history of blunt trauma   -Does not smoke   -Does not need AREDS vitamins   -Monitor    # Glaucoma suspect   -Based on cupping  -Pach's: 546/546  -Tmax: 17/20  -IOP today: 16/14  - OCT RNFL 10/23/23: normal thickness both eyes, no progression.  -Follows with Dr. Cosby    # Cataract   -BCVA 20/20, ou   -not visually significant   -monitor    # vascular malformation, left plica   -slit lamp photos 4/21/23   -Unchanged from prior        return to clinic: as needed with retina service, Annual exams with Dr. Harjeet Flores MD  PGY-3 Ophthalmology    Attestation:  I have seen and examined the patient with Dr. Sandra MD and agree with the findings in this note, as well as the interpretations of the diagnostic tests.      Marsha Tompkins MD PhD.  Professor & Chair      ATTESTATION

## 2024-03-28 DIAGNOSIS — H35.89 RPE MOTTLING OF MACULA: Primary | ICD-10-CM

## 2024-03-28 DIAGNOSIS — H40.003 GLAUCOMA SUSPECT OF BOTH EYES: ICD-10-CM

## 2024-04-25 ENCOUNTER — TELEPHONE (OUTPATIENT)
Dept: OPHTHALMOLOGY | Facility: CLINIC | Age: 59
End: 2024-04-25

## 2024-05-07 ENCOUNTER — TELEPHONE (OUTPATIENT)
Dept: OPHTHALMOLOGY | Facility: CLINIC | Age: 59
End: 2024-05-07
Payer: COMMERCIAL

## 2024-05-16 DIAGNOSIS — H35.89 RPE MOTTLING OF MACULA: Primary | ICD-10-CM

## 2024-05-16 DIAGNOSIS — H40.003 GLAUCOMA SUSPECT OF BOTH EYES: ICD-10-CM

## 2024-07-24 ENCOUNTER — OFFICE VISIT (OUTPATIENT)
Dept: OPHTHALMOLOGY | Facility: CLINIC | Age: 59
End: 2024-07-24
Attending: OPHTHALMOLOGY
Payer: COMMERCIAL

## 2024-07-24 DIAGNOSIS — H35.89 RPE MOTTLING OF MACULA: ICD-10-CM

## 2024-07-24 DIAGNOSIS — H40.003 GLAUCOMA SUSPECT OF BOTH EYES: Primary | ICD-10-CM

## 2024-07-24 PROCEDURE — 92014 COMPRE OPH EXAM EST PT 1/>: CPT | Performed by: OPHTHALMOLOGY

## 2024-07-24 PROCEDURE — 92083 EXTENDED VISUAL FIELD XM: CPT | Performed by: OPHTHALMOLOGY

## 2024-07-24 PROCEDURE — 92134 CPTRZ OPH DX IMG PST SGM RTA: CPT | Performed by: OPHTHALMOLOGY

## 2024-07-24 PROCEDURE — G0463 HOSPITAL OUTPT CLINIC VISIT: HCPCS | Performed by: OPHTHALMOLOGY

## 2024-07-24 PROCEDURE — 92133 CPTRZD OPH DX IMG PST SGM ON: CPT | Performed by: OPHTHALMOLOGY

## 2024-07-24 PROCEDURE — 99207 OCT OPTIC NERVE RNFL SPECTRALIS OU (BOTH EYES): CPT | Mod: 26 | Performed by: OPHTHALMOLOGY

## 2024-07-24 PROCEDURE — 92083 EXTENDED VISUAL FIELD XM: CPT | Mod: 26 | Performed by: OPHTHALMOLOGY

## 2024-07-24 ASSESSMENT — SLIT LAMP EXAM - LIDS
COMMENTS: NORMAL
COMMENTS: NORMAL

## 2024-07-24 ASSESSMENT — CUP TO DISC RATIO
OD_RATIO: 0.5
OS_RATIO: 0.65

## 2024-07-24 ASSESSMENT — VISUAL ACUITY
OD_SC: 20/20
OS_SC: 20/20
METHOD: SNELLEN - LINEAR
METHOD_MR: PT DEFERS
OD_SC+: -1

## 2024-07-24 ASSESSMENT — TONOMETRY
OS_IOP_MMHG: 16
OD_IOP_MMHG: 18
IOP_METHOD: TONOPEN

## 2024-07-24 ASSESSMENT — PACHYMETRY
OD_CT(UM): 546
OS_CT(UM): 554

## 2024-07-24 ASSESSMENT — EXTERNAL EXAM - LEFT EYE: OS_EXAM: NORMAL

## 2024-07-24 ASSESSMENT — EXTERNAL EXAM - RIGHT EYE: OD_EXAM: NORMAL

## 2024-07-24 NOTE — NURSING NOTE
Chief Complaints and History of Present Illnesses   Patient presents with    Glaucoma Follow-Up     1 year follow up Glaucoma suspect due to family history (MGM) and cupping     Chief Complaint(s) and History of Present Illness(es)       Glaucoma Follow-Up              Associated symptoms: dryness.  Negative for eye pain, flashes and floaters    Pain scale: 0/10    Comments: 1 year follow up Glaucoma suspect due to family history (MGM) and cupping              Comments    Pt states happy with distance VA and only uses OTC readers for near.   Denies eye pain, flashes or floaters.  She gets daily dryness at night, and uses AT PRN for relief.   During stressful situations she tells me she sees rainbows.     Geovanny Horne 10:30 AM July 24, 2024

## 2024-07-24 NOTE — PROGRESS NOTES
HPI       Glaucoma Follow-Up    Associated symptoms include dryness.  Negative for eye pain, flashes and floaters.  Pain was noted as 0/10. Additional comments: 1 year follow up Glaucoma suspect due to family history (MGM) and cupping             Comments    Pt states happy with distance VA and only uses OTC readers for near.   Denies eye pain, flashes or floaters.  She gets daily dryness at night, and uses AT PRN for relief.   During stressful situations she tells me she sees rainbows.     Geovanny Horne 10:30 AM July 24, 2024            Last edited by Geovanny Horne on 7/24/2024 10:30 AM.          Review of systems for the eyes was negative other than the pertinent positives/negatives listed in the HPI.      Assessment & Plan      Sharri Watson is a 59 year old female with the following diagnoses:   1. Glaucoma suspect of both eyes    2. RPE mottling of macula       Here for glaucoma evaluation  Cupping both eyes   -Pach's: 546/546  -Tmax: 23 both eyes   -IOP today: 18/16    Dilated fundus exam stable  OCT mac and nerve remain stable  Low risk suspect, ok to monitor annually      Patient disposition:   Return in about 1 year (around 7/24/2025) for DFE, OCT NFL.           Attending Physician Attestation:  Complete documentation of historical and exam elements from today's encounter can be found in the full encounter summary report (not reduplicated in this progress note).  I personally obtained the chief complaint(s) and history of present illness.  I confirmed and edited as necessary the review of systems, past medical/surgical history, family history, social history, and examination findings as documented by others; and I examined the patient myself.  I personally reviewed the relevant tests, images, and reports as documented above.  I formulated and edited as necessary the assessment and plan and discussed the findings and management plan with the patient and family. . - Jan Joseph MD

## 2024-07-31 ASSESSMENT — CONF VISUAL FIELD
OD_NORMAL: 1
OD_SUPERIOR_TEMPORAL_RESTRICTION: 0
OS_INFERIOR_NASAL_RESTRICTION: 0
OS_SUPERIOR_NASAL_RESTRICTION: 0
OS_SUPERIOR_TEMPORAL_RESTRICTION: 0
OD_INFERIOR_TEMPORAL_RESTRICTION: 0
OS_INFERIOR_TEMPORAL_RESTRICTION: 0
OD_INFERIOR_NASAL_RESTRICTION: 0
OS_NORMAL: 1
OD_SUPERIOR_NASAL_RESTRICTION: 0

## 2024-08-18 ENCOUNTER — HEALTH MAINTENANCE LETTER (OUTPATIENT)
Age: 59
End: 2024-08-18

## 2025-04-13 ENCOUNTER — HEALTH MAINTENANCE LETTER (OUTPATIENT)
Age: 60
End: 2025-04-13

## (undated) DEVICE — SOL WATER IRRIG 500ML BOTTLE 2F7113

## (undated) DEVICE — ESU EYE HIGH TEMP 65410-183

## (undated) DEVICE — LINEN TOWEL PACK X5 5464

## (undated) DEVICE — BLADE KNIFE SURG 15 371115

## (undated) DEVICE — ESU CORD BIPOLAR GREEN 10-4000

## (undated) DEVICE — PACK MINOR EYE CUSTOM ASC

## (undated) DEVICE — PEN MARKING SKIN TYCO DEVON DUAL TIP 31145868

## (undated) DEVICE — EYE PREP BETADINE 5% SOLUTION 30ML 0065-0411-30

## (undated) DEVICE — GLOVE PROTEXIS MICRO 7.5  2D73PM75

## (undated) DEVICE — SOL NACL 0.9% IRRIG 500ML BOTTLE 2F7123

## (undated) DEVICE — EYE SHIELD OPHTHALMIC 8MM MEROCEL 400106

## (undated) DEVICE — SU VICRYL 7-0 TG140-8DA 18" J546G

## (undated) RX ORDER — FENTANYL CITRATE 50 UG/ML
INJECTION, SOLUTION INTRAMUSCULAR; INTRAVENOUS
Status: DISPENSED
Start: 2020-11-16

## (undated) RX ORDER — ONDANSETRON 2 MG/ML
INJECTION INTRAMUSCULAR; INTRAVENOUS
Status: DISPENSED
Start: 2020-11-16

## (undated) RX ORDER — ACETAMINOPHEN 325 MG/1
TABLET ORAL
Status: DISPENSED
Start: 2020-11-16